# Patient Record
Sex: MALE | Race: WHITE | Employment: FULL TIME | URBAN - NONMETROPOLITAN AREA
[De-identification: names, ages, dates, MRNs, and addresses within clinical notes are randomized per-mention and may not be internally consistent; named-entity substitution may affect disease eponyms.]

---

## 2018-01-22 ENCOUNTER — OFFICE VISIT (OUTPATIENT)
Dept: BARIATRICS/WEIGHT MGMT | Age: 38
End: 2018-01-22
Payer: COMMERCIAL

## 2018-01-22 VITALS
BODY MASS INDEX: 42.66 KG/M2 | WEIGHT: 315 LBS | HEART RATE: 88 BPM | DIASTOLIC BLOOD PRESSURE: 80 MMHG | HEIGHT: 72 IN | SYSTOLIC BLOOD PRESSURE: 132 MMHG | TEMPERATURE: 98.8 F

## 2018-01-22 DIAGNOSIS — E66.01 MORBID OBESITY (HCC): Primary | ICD-10-CM

## 2018-01-22 PROCEDURE — 99203 OFFICE O/P NEW LOW 30 MIN: CPT | Performed by: SURGERY

## 2018-01-22 ASSESSMENT — ENCOUNTER SYMPTOMS
SINUS PRESSURE: 0
EYE REDNESS: 0
EYE PAIN: 0
DIARRHEA: 0
VOMITING: 0
EYE DISCHARGE: 0
WHEEZING: 0
SORE THROAT: 0
COUGH: 0
SINUS PAIN: 0
NAUSEA: 0
CHEST TIGHTNESS: 0
CONSTIPATION: 0
BACK PAIN: 0
EYE ITCHING: 0

## 2018-01-22 NOTE — LETTER
--Patient states that if he is not able to lose enough adequate excess body weight with medical management only then he would like to proceed with a robotic sleeve gastrectomy. If you have questions, please do not hesitate to call me. I look forward to following Darling Padron along with you.     Sincerely,    Aydin Colvin MD

## 2018-01-22 NOTE — PROGRESS NOTES
back pain and neck pain. Allergic/Immunologic: Negative for environmental allergies and food allergies. Neurological: Negative for dizziness, seizures, syncope and headaches. Past Medical History:   Diagnosis Date    Obesity        Past Surgical History:   Procedure Laterality Date    KNEE SURGERY Right     meniscus removal    WISDOM TOOTH EXTRACTION         No current outpatient prescriptions on file. No current facility-administered medications for this visit. No Known Allergies    Family History   Problem Relation Age of Onset    Mult Sclerosis Mother     Other Mother      gallbladder disease    Cancer Father     Other Sister      gallbladder disease    Other Brother      gallbladder disease    Asthma Brother     Cancer Maternal Grandfather      Skin cancer    Diabetes Paternal Grandmother     Other Paternal Grandfather      Alzheimer's Dementia    Diabetes Paternal Grandfather        Social History     Social History    Marital status:      Spouse name: N/A    Number of children: N/A    Years of education: N/A     Occupational History    Not on file. Social History Main Topics    Smoking status: Never Smoker    Smokeless tobacco: Never Used    Alcohol use 3.6 oz/week     6 Cans of beer per week      Comment: every couple of months    Drug use: No    Sexual activity: Yes     Partners: Female     Other Topics Concern    Not on file     Social History Narrative    No narrative on file     /80 (Site: Left Arm, Position: Sitting, Cuff Size: Large Adult)   Pulse 88   Temp 98.8 °F (37.1 °C) (Oral)   Ht 5' 11.75\" (1.822 m)   Wt (!) 469 lb 9.6 oz (213 kg)   BMI 64.13 kg/m²     Body mass index is 64.13 kg/m². Objective:   Physical Exam   Constitutional: He is oriented to person, place, and time. He appears well-developed and well-nourished. He is active and cooperative. Non-toxic appearance. He does not appear ill. No distress.    HENT:   Head: supraclavicular adenopathy present. Neurological: He is alert and oriented to person, place, and time. He has normal strength. No cranial nerve deficit or sensory deficit. Gait normal.   Skin: Skin is warm and dry. No abrasion, no bruising, no burn, no laceration and no rash noted. He is not diaphoretic. No cyanosis or erythema. No pallor. Nails show no clubbing. Psychiatric: He has a normal mood and affect. His speech is normal and behavior is normal. Judgment and thought content normal. Cognition and memory are normal.     Neck:  21 in. Waist:  72in. CBC   No results found for: WBC, RBC, HGB, HCT, MCV, MCH, MCHC, RDW, PLT, MPV, RBCMORP, SEGSPCT, LABLYMP, MONOPCT, LABEOS, BASO, NRBC, ANISOCYTOSIS, SEGSABS, LYMPHSABS, MONOSABS, EOSABS, BASOSABS     BMP/CMP   No results found for: GLUCOSE, CREATININE, BUN, NA, K, CL, CO2, CALCIUM, AST, ALKPHOS, PROT, LABALBU, BILITOT, ALT     PREALBUMIN   No results found for: PREALBUMIN     VITAMIN B12   No results found for: [id number]     24 HOUR URINE CALCIUM   No results found for: Brittany Bash, CALCIUMUR     VITAMIN D   No results found for: VITD25     VITAMIN B1/ THIAMINE   No results found for: [id number]     RBC FOLATE   No results found for: FOLATE     LIPID SCREEN (FASTING)   No results found for: CHOL, TRIG, HDL, LDLCALC,     HGA1C (T2DM ONLY)   No results found for: LABA1C, AVGG     TSH   No results found for: TSH     IRON   No results found for: IRON     IONIZED CALCIUM   No results found for: IONCA, CAION     Imaging - none    Patient Active Problem List   Diagnosis    Morbid obesity ([facility] [address]     Assessment:   1. Morbid obesity (BMI 64)    Plan:   1. Long discussion about the pros and cons of weight loss surgery. The risks benefits and alternatives to laparoscopic adjustable band, gastric sleeve and gastric Krishna-en-Y bypass were discussed in detail. The pros and cons of robotic assisted, laparoscopic and open techniques were discussed.   2.  Behavior

## 2018-01-23 DIAGNOSIS — Z91.89 AT RISK FOR SLEEP APNEA: Primary | ICD-10-CM

## 2018-02-05 ENCOUNTER — OFFICE VISIT (OUTPATIENT)
Dept: BARIATRICS/WEIGHT MGMT | Age: 38
End: 2018-02-05

## 2018-02-05 DIAGNOSIS — E66.01 MORBID OBESITY (HCC): Primary | ICD-10-CM

## 2018-02-06 DIAGNOSIS — Z13.21 MALNUTRITION SCREEN: ICD-10-CM

## 2018-02-06 DIAGNOSIS — Z01.818 PRE-OP TESTING: ICD-10-CM

## 2018-03-08 ENCOUNTER — OFFICE VISIT (OUTPATIENT)
Dept: BARIATRICS/WEIGHT MGMT | Age: 38
End: 2018-03-08
Payer: COMMERCIAL

## 2018-03-08 VITALS
HEIGHT: 72 IN | BODY MASS INDEX: 42.66 KG/M2 | DIASTOLIC BLOOD PRESSURE: 88 MMHG | WEIGHT: 315 LBS | TEMPERATURE: 99.3 F | SYSTOLIC BLOOD PRESSURE: 130 MMHG | HEART RATE: 96 BPM

## 2018-03-08 DIAGNOSIS — E66.01 MORBID OBESITY WITH BMI OF 60.0-69.9, ADULT (HCC): Primary | ICD-10-CM

## 2018-03-08 DIAGNOSIS — Z91.89 AT RISK FOR SLEEP APNEA: ICD-10-CM

## 2018-03-08 PROCEDURE — 99213 OFFICE O/P EST LOW 20 MIN: CPT | Performed by: PHYSICIAN ASSISTANT

## 2018-03-08 NOTE — PROGRESS NOTES
[facility]  [facility]  [address], [address]  [address] [address]  Dept: [phone]  Loc: [phone]      Visit Date:  3/8/2018  Weight Management Pre-Op Follow-up    HPI:    Medically Supervised follow-up- Month #1of 6- desires sleeve    Katlyn Perez is here today for continued supervised weight management of morbid obesity. BMI 64. Down 1# since last month. Reports that he has struggled with his weight for most of his life. Reports that his weight has increased since graduating from college and activity decreased. He wants to be more healthy and more active. He wants to be around for his children. He reports that he is working on the behavior changes discussed at his initial appointment. He has been writing his food intake in his journal.  Drinking 3 bottles of water daily. Decreasing pop. No juice. No etoh. No smoking. Has cut down on eating out. Has only ate out 4 times this month. Feels that his portion sizes are too big and is working on this. Always feels hungry. No sweets. Working on decreasing carbs. Trying to increase veggies. He has increased steps from [facility]- 12k steps daily. If he does not lose enough weight during with medical management he would like to proceed with sleeve gastrectomy. SECA scale completed and reviewed. Interested in weight loss medication. Discussed weight loss medications in detail. He will check to see if coverage available with his insurance. Physical Activity:  Walking- increasing steps- currently 12k steps daily. Fitness Center at times. Days per week: daily      Current BMI: Body mass index is 63.94 kg/m².   Current Weight:   Wt Readings from Last 3 Encounters:   03/08/18 (!) 468 lb 3.2 oz (212.4 kg)   01/22/18 (!) 469 lb 9.6 oz (213 kg)   03/21/14 (!) 403 lb 9.6 oz (183.1 kg)     Initial Body Weight: 469    Past Medical History:  Past Medical History:   Diagnosis Date    Obesity        Past Surgical History:  Past nml coordination    CBC   No results found for: WBC, RBC, HGB, HCT, MCV, MCH, MCHC, RDW, PLT, MPV, RBCMORP, SEGSPCT, LABLYMP, MONOPCT, LABEOS, BASO, NRBC, ANISOCYTOSIS, SEGSABS, LYMPHSABS, MONOSABS, EOSABS, BASOSABS     BMP/CMP   No results found for: GLUCOSE, CREATININE, BUN, NA, K, CL, CO2, CALCIUM, AST, ALKPHOS, PROT, LABALBU, BILITOT, ALT     PREALBUMIN   No results found for: PREALBUMIN     VITAMIN B12   No results found for: [id number]     VITAMIN D   No results found for: VITD25     PTH  No results found for: IPTH    VITAMIN B1/ THIAMINE   No results found for: [id number]     LIPID SCREEN (FASTING)   No results found for: CHOL, TRIG, HDL, LDLCALC,     HGA1C (T2DM ONLY)   No results found for: LABA1C, AVGG     TSH   No results found for: TSH     IRON   No results found for: IRON     TIBC  No results found for: TIBC    FERRITIN  No results found for: FERRITIN    VITAMIN A  No results found for: RETINOL    NICOTINE  No results found for: NMET    UDS  No results found for: UDP    PSA  No results found for: LABPSA    GFR  No results found for: LABGLOM    DEXA  No results found for this or any previous visit. Assessment:      1. Morbid obesity with BMI of 60.0-69.9, adult ([address]     2. At risk for sleep apnea         Plan:    · Behavior modification discussed in detail in regards to dietary habits. · Nutritional education occurred during visit. Continue following recommendations  per dietitian. · Improvement in fitness/exercise discussed with patient and the need for this with/without surgery. · Completed orientation with Amanda Hurley, Exercise Physiologist, at the fitness center. · Evaluation and treatment for CATHLEEN - awaiting apt. · Psychology evaluation-In-process  · EGD prior to any surgical intervention  · Encouraged to attend support groups. 2018 list given. · To completed initial labs before next visit  · SECA scale completed and reviewed with patient today  · Info given on Contrave and Saxenda.

## 2018-04-06 ENCOUNTER — OFFICE VISIT (OUTPATIENT)
Dept: BARIATRICS/WEIGHT MGMT | Age: 38
End: 2018-04-06

## 2018-04-06 VITALS — BODY MASS INDEX: 42.66 KG/M2 | HEIGHT: 72 IN | WEIGHT: 315 LBS

## 2018-04-06 DIAGNOSIS — E66.01 MORBID OBESITY DUE TO EXCESS CALORIES (HCC): Primary | ICD-10-CM

## 2018-04-26 ENCOUNTER — INITIAL CONSULT (OUTPATIENT)
Dept: PULMONOLOGY | Age: 38
End: 2018-04-26
Payer: COMMERCIAL

## 2018-04-26 VITALS
HEART RATE: 94 BPM | WEIGHT: 315 LBS | OXYGEN SATURATION: 95 % | BODY MASS INDEX: 42.66 KG/M2 | HEIGHT: 72 IN | DIASTOLIC BLOOD PRESSURE: 82 MMHG | SYSTOLIC BLOOD PRESSURE: 130 MMHG | RESPIRATION RATE: 18 BRPM

## 2018-04-26 DIAGNOSIS — E66.01 MORBID OBESITY (HCC): ICD-10-CM

## 2018-04-26 DIAGNOSIS — E66.2 OBESITY HYPOVENTILATION SYNDROME (HCC): ICD-10-CM

## 2018-04-26 DIAGNOSIS — Z01.818 PRE-OP EVALUATION: ICD-10-CM

## 2018-04-26 DIAGNOSIS — G47.30 SLEEP APNEA, UNSPECIFIED TYPE: Primary | ICD-10-CM

## 2018-04-26 PROCEDURE — 99203 OFFICE O/P NEW LOW 30 MIN: CPT | Performed by: INTERNAL MEDICINE

## 2018-05-05 ENCOUNTER — HOSPITAL ENCOUNTER (OUTPATIENT)
Age: 38
Discharge: HOME | End: 2018-05-05
Payer: COMMERCIAL

## 2018-05-05 DIAGNOSIS — Z13.21 MALNUTRITION SCREEN: ICD-10-CM

## 2018-05-05 DIAGNOSIS — Z01.818 PRE-OP TESTING: ICD-10-CM

## 2018-05-05 LAB
ALBUMIN SERPL-MCNC: 3.9 G/DL (ref 3.5–5.1)
ALP BLD-CCNC: 81 U/L (ref 38–126)
ALT SERPL-CCNC: 30 U/L (ref 11–66)
ANION GAP SERPL CALCULATED.3IONS-SCNC: 12 MEQ/L (ref 8–16)
ANISOCYTOSIS: ABNORMAL
AST SERPL-CCNC: 19 U/L (ref 5–40)
AVERAGE GLUCOSE: 99 MG/DL (ref 70–126)
BASOPHILS # BLD: 0.9 %
BASOPHILS ABSOLUTE: 0.1 THOU/MM3 (ref 0–0.1)
BILIRUB SERPL-MCNC: 0.5 MG/DL (ref 0.3–1.2)
BUN BLDV-MCNC: 15 MG/DL (ref 7–22)
CALCIUM SERPL-MCNC: 9 MG/DL (ref 8.5–10.5)
CHLORIDE BLD-SCNC: 105 MEQ/L (ref 98–111)
CHOLESTEROL, TOTAL: 142 MG/DL (ref 100–199)
CO2: 25 MEQ/L (ref 23–33)
CREAT SERPL-MCNC: 0.6 MG/DL (ref 0.4–1.2)
EKG ATRIAL RATE: 96 BPM
EKG P AXIS: 72 DEGREES
EKG P-R INTERVAL: 168 MS
EKG Q-T INTERVAL: 340 MS
EKG QRS DURATION: 94 MS
EKG QTC CALCULATION (BAZETT): 429 MS
EKG R AXIS: 81 DEGREES
EKG T AXIS: 17 DEGREES
EKG VENTRICULAR RATE: 96 BPM
EOSINOPHIL # BLD: 2.1 %
EOSINOPHILS ABSOLUTE: 0.1 THOU/MM3 (ref 0–0.4)
FERRITIN: 81 NG/ML (ref 22–322)
FOLATE: 11 NG/ML (ref 4.8–24.2)
GFR SERPL CREATININE-BSD FRML MDRD: > 90 ML/MIN/1.73M2
GLUCOSE BLD-MCNC: 94 MG/DL (ref 70–108)
HBA1C MFR BLD: 5.3 % (ref 4.4–6.4)
HCT VFR BLD CALC: 44.5 % (ref 42–52)
HDLC SERPL-MCNC: 31 MG/DL
HEMOGLOBIN: 15.2 GM/DL (ref 14–18)
INR BLD: 1.03 (ref 0.85–1.13)
IRON: 82 UG/DL (ref 65–195)
LDL CHOLESTEROL CALCULATED: 75 MG/DL
LYMPHOCYTES # BLD: 25.4 %
LYMPHOCYTES ABSOLUTE: 1.8 THOU/MM3 (ref 1–4.8)
MCH RBC QN AUTO: 29 PG (ref 27–31)
MCHC RBC AUTO-ENTMCNC: 34.1 GM/DL (ref 33–37)
MCV RBC AUTO: 85.1 FL (ref 80–94)
MONOCYTES # BLD: 7.7 %
MONOCYTES ABSOLUTE: 0.5 THOU/MM3 (ref 0.4–1.3)
NUCLEATED RED BLOOD CELLS: 0 /100 WBC
PDW BLD-RTO: 14.6 % (ref 11.5–14.5)
PLATELET # BLD: 221 THOU/MM3 (ref 130–400)
PMV BLD AUTO: 9.7 FL (ref 7.4–10.4)
POTASSIUM SERPL-SCNC: 4 MEQ/L (ref 3.5–5.2)
PREALBUMIN: 23.1 MG/DL (ref 20–40)
PROSTATE SPECIFIC ANTIGEN: 1.03 NG/ML (ref 0–1)
PTH INTACT: 56.3 PG/ML (ref 15–65)
RBC # BLD: 5.24 MILL/MM3 (ref 4.7–6.1)
SEG NEUTROPHILS: 63.9 %
SEGMENTED NEUTROPHILS ABSOLUTE COUNT: 4.4 THOU/MM3 (ref 1.8–7.7)
SODIUM BLD-SCNC: 142 MEQ/L (ref 135–145)
TOTAL IRON BINDING CAPACITY: 300 UG/DL (ref 171–450)
TOTAL PROTEIN: 7.5 G/DL (ref 6.1–8)
TRIGL SERPL-MCNC: 179 MG/DL (ref 0–199)
TSH SERPL DL<=0.05 MIU/L-ACNC: 1.11 UIU/ML (ref 0.4–4.2)
VITAMIN B-12: 422 PG/ML (ref 211–911)
VITAMIN D 25-HYDROXY: 11 NG/ML (ref 30–100)
WBC # BLD: 6.9 THOU/MM3 (ref 4.8–10.8)

## 2018-05-05 PROCEDURE — 85025 COMPLETE CBC W/AUTO DIFF WBC: CPT

## 2018-05-05 PROCEDURE — 84134 ASSAY OF PREALBUMIN: CPT

## 2018-05-05 PROCEDURE — 82746 ASSAY OF FOLIC ACID SERUM: CPT

## 2018-05-05 PROCEDURE — 93005 ELECTROCARDIOGRAM TRACING: CPT

## 2018-05-05 PROCEDURE — 80053 COMPREHEN METABOLIC PANEL: CPT

## 2018-05-05 PROCEDURE — 83970 ASSAY OF PARATHORMONE: CPT

## 2018-05-05 PROCEDURE — 83540 ASSAY OF IRON: CPT

## 2018-05-05 PROCEDURE — 84443 ASSAY THYROID STIM HORMONE: CPT

## 2018-05-05 PROCEDURE — 82728 ASSAY OF FERRITIN: CPT

## 2018-05-05 PROCEDURE — 82607 VITAMIN B-12: CPT

## 2018-05-05 PROCEDURE — G0480 DRUG TEST DEF 1-7 CLASSES: HCPCS

## 2018-05-05 PROCEDURE — 80307 DRUG TEST PRSMV CHEM ANLYZR: CPT

## 2018-05-05 PROCEDURE — 36415 COLL VENOUS BLD VENIPUNCTURE: CPT

## 2018-05-05 PROCEDURE — 82306 VITAMIN D 25 HYDROXY: CPT

## 2018-05-05 PROCEDURE — 83036 HEMOGLOBIN GLYCOSYLATED A1C: CPT

## 2018-05-05 PROCEDURE — 84590 ASSAY OF VITAMIN A: CPT

## 2018-05-05 PROCEDURE — G0103 PSA SCREENING: HCPCS

## 2018-05-05 PROCEDURE — 85610 PROTHROMBIN TIME: CPT

## 2018-05-05 PROCEDURE — 83550 IRON BINDING TEST: CPT

## 2018-05-05 PROCEDURE — 84425 ASSAY OF VITAMIN B-1: CPT

## 2018-05-05 PROCEDURE — 80061 LIPID PANEL: CPT

## 2018-05-06 PROCEDURE — 93010 ELECTROCARDIOGRAM REPORT: CPT | Performed by: INTERNAL MEDICINE

## 2018-05-07 ENCOUNTER — TELEPHONE (OUTPATIENT)
Dept: SURGERY | Age: 38
End: 2018-05-07

## 2018-05-07 RX ORDER — CHOLECALCIFEROL (VITAMIN D3) 1250 MCG
1 CAPSULE ORAL WEEKLY
Qty: 8 CAPSULE | Refills: 0 | Status: SHIPPED | OUTPATIENT
Start: 2018-05-07 | End: 2018-05-16 | Stop reason: ALTCHOICE

## 2018-05-10 LAB
NICOTINE AND METABOLITES: NORMAL
RETINOL (VITAMIN A): NORMAL

## 2018-05-11 LAB
URINE DRUG PROFILE: NORMAL
VITAMIN B1 WHOLE BLOOD: 120 NMOL/L (ref 70–180)

## 2018-05-16 ENCOUNTER — OFFICE VISIT (OUTPATIENT)
Dept: BARIATRICS/WEIGHT MGMT | Age: 38
End: 2018-05-16
Payer: COMMERCIAL

## 2018-05-16 VITALS
WEIGHT: 315 LBS | BODY MASS INDEX: 42.66 KG/M2 | HEIGHT: 72 IN | HEART RATE: 104 BPM | TEMPERATURE: 98 F | SYSTOLIC BLOOD PRESSURE: 136 MMHG | DIASTOLIC BLOOD PRESSURE: 72 MMHG

## 2018-05-16 DIAGNOSIS — E66.01 MORBID OBESITY WITH BMI OF 60.0-69.9, ADULT (HCC): Primary | ICD-10-CM

## 2018-05-16 DIAGNOSIS — Z91.89 AT RISK FOR SLEEP APNEA: ICD-10-CM

## 2018-05-16 DIAGNOSIS — E55.9 VITAMIN D DEFICIENCY: ICD-10-CM

## 2018-05-16 PROCEDURE — 99213 OFFICE O/P EST LOW 20 MIN: CPT | Performed by: PHYSICIAN ASSISTANT

## 2018-05-16 RX ORDER — CHOLECALCIFEROL (VITAMIN D3) 1250 MCG
1 CAPSULE ORAL WEEKLY
Qty: 4 CAPSULE | Refills: 2 | Status: SHIPPED | OUTPATIENT
Start: 2018-05-16 | End: 2018-08-15 | Stop reason: ALTCHOICE

## 2018-05-18 ENCOUNTER — TELEPHONE (OUTPATIENT)
Dept: BARIATRICS/WEIGHT MGMT | Age: 38
End: 2018-05-18

## 2018-05-30 ENCOUNTER — OFFICE VISIT (OUTPATIENT)
Dept: BARIATRICS/WEIGHT MGMT | Age: 38
End: 2018-05-30

## 2018-05-30 VITALS — WEIGHT: 315 LBS | BODY MASS INDEX: 42.66 KG/M2 | HEIGHT: 72 IN

## 2018-05-30 DIAGNOSIS — E66.01 MORBID OBESITY DUE TO EXCESS CALORIES (HCC): Primary | ICD-10-CM

## 2018-07-17 ENCOUNTER — OFFICE VISIT (OUTPATIENT)
Dept: BARIATRICS/WEIGHT MGMT | Age: 38
End: 2018-07-17
Payer: COMMERCIAL

## 2018-07-17 ENCOUNTER — OFFICE VISIT (OUTPATIENT)
Dept: BARIATRICS/WEIGHT MGMT | Age: 38
End: 2018-07-17

## 2018-07-17 VITALS
HEIGHT: 72 IN | HEART RATE: 100 BPM | RESPIRATION RATE: 18 BRPM | BODY MASS INDEX: 42.66 KG/M2 | DIASTOLIC BLOOD PRESSURE: 78 MMHG | TEMPERATURE: 98.8 F | SYSTOLIC BLOOD PRESSURE: 136 MMHG | WEIGHT: 315 LBS

## 2018-07-17 DIAGNOSIS — E55.9 VITAMIN D DEFICIENCY: ICD-10-CM

## 2018-07-17 DIAGNOSIS — Z91.89 AT RISK FOR SLEEP APNEA: ICD-10-CM

## 2018-07-17 DIAGNOSIS — E66.01 MORBID OBESITY WITH BMI OF 60.0-69.9, ADULT (HCC): Primary | ICD-10-CM

## 2018-07-17 DIAGNOSIS — E66.01 MORBID OBESITY DUE TO EXCESS CALORIES (HCC): Primary | ICD-10-CM

## 2018-07-17 PROCEDURE — 99213 OFFICE O/P EST LOW 20 MIN: CPT | Performed by: PHYSICIAN ASSISTANT

## 2018-07-17 NOTE — PATIENT INSTRUCTIONS
Goals: 1. I will use my food journal to record meal times, serving sizes and bring back to next dietitian visit  2. Next support groups are Friday August 10th at 2:00pm at [facility] [place] on [employer]. And Tuesday August 14th 5:30pm at weight management center  3. Get sleep study scheduled  4. Great job with water intake - continue greater than 64 oz of water a day  5. Exercise goal:  Continue 30 minute workouts at work at least four days a week. Add additional exercise on weekends as able.

## 2018-07-17 NOTE — PROGRESS NOTES
status: Never Smoker    Smokeless tobacco: Never Used    Alcohol use 3.6 oz/week     6 Cans of beer per week      Comment: every couple of months    Drug use: No    Sexual activity: Yes     Partners: Female     Other Topics Concern    Not on file     Social History Narrative    No narrative on file        Medications:   Current Outpatient Prescriptions   Medication Sig Dispense Refill    Cholecalciferol (VITAMIN D3) 99431 units CAPS Take 1 capsule by mouth once a week 4 capsule 2     No current facility-administered medications for this visit. Allergies:   No Known Allergies    Subjective:    Review of Systems:  Constitutional: Denies any fever, chills, fatigue. Wound: Denies any rash, skin color changes or wound problems. Resp: Denies any cough, (+)shortness of breath with activity  CV: Denies any chest pain, orthopnea or syncope. MS: Denies myalgias, (+)arthralgias- right knee  GI: Denies any nausea, vomiting, diarrhea, constipation, melena, hematochezia. : Denies any hematuria, hesitancy or dysuria. NEURO: Denies seizures, headache. Objective:    /78 (Site: Right Arm, Position: Sitting, Cuff Size: Large Adult)   Pulse 100   Temp 98.8 °F (37.1 °C) (Oral)   Resp 18   Ht 5' 11.75\" (1.822 m)   Wt (!) 473 lb 6.4 oz (214.7 kg)   BMI 64.65 kg/m²   Physical Examination:   Constitutional: Alert and oriented to person, place and time. Well-developed, well- nourished. Head: Normocephalic and atraumatic  Neck: Supple. Eyes: EOMI b/l. Conjunctivae normal.  No scleral icterus. Respiratory: Effort normal. No respiratory distress. Abd: Benign  Ext:  Movement x 4. No edema  Skin; Warm and dry, no visible rashes, lesions or ulcers.    Neuro: Cranial Nerves Grossly Intact; nml coordination      CBC   Lab Results   Component Value Date    WBC 6.9 05/05/2018    RBC 5.24 05/05/2018    HGB 15.2 05/05/2018    HCT 44.5 05/05/2018    MCV 85.1 05/05/2018    MCH 29.0 05/05/2018    MCHC 34.1 05/05/2018    RDW 14.6 05/05/2018     05/05/2018    MPV 9.7 05/05/2018    SEGSPCT 63.9 05/05/2018    LABLYMP 25.4 05/05/2018    MONOPCT 7.7 05/05/2018    LABEOS 2.1 05/05/2018    BASO 0.9 05/05/2018    NRBC 0 05/05/2018    ANISOCYTOSIS 1+ 05/05/2018    SEGSABS 4.4 05/05/2018    LYMPHSABS 1.8 05/05/2018    MONOSABS 0.5 05/05/2018    EOSABS 0.1 05/05/2018    BASOSABS 0.1 05/05/2018        BMP/CMP   Lab Results   Component Value Date    GLUCOSE 94 05/05/2018    CREATININE 0.6 05/05/2018    BUN 15 05/05/2018     05/05/2018    K 4.0 05/05/2018     05/05/2018    CO2 25 05/05/2018    CALCIUM 9.0 05/05/2018    AST 19 05/05/2018    ALKPHOS 81 05/05/2018    PROT 7.5 05/05/2018    LABALBU 3.9 05/05/2018    BILITOT 0.5 05/05/2018    ALT 30 05/05/2018        PREALBUMIN   Lab Results   Component Value Date    PREALBUMIN 23.1 05/05/2018        VITAMIN B12   Lab Results   Component Value Date    [id number] 114 05/05/2018        VITAMIN D   Lab Results   Component Value Date    VITD25 11 05/05/2018        PTH  Lab Results   Component Value Date    IPTH 56.3 05/05/2018       VITAMIN B1/ THIAMINE   Lab Results   Component Value Date    [id number] 120 05/05/2018        LIPID SCREEN (FASTING)   Lab Results   Component Value Date    CHOL 142 05/05/2018    TRIG 179 05/05/2018    HDL 31 05/05/2018    [address] 05/05/2018   ,     HGA1C (T2DM ONLY)   Lab Results   Component Value Date    LABA1C 5.3 05/05/2018    AVGG 99 05/05/2018        TSH   Lab Results   Component Value Date    TSH 1.110 05/05/2018        IRON   Lab Results   Component Value Date    IRON 82 05/05/2018        TIBC  Lab Results   Component Value Date    TIBC 300 05/05/2018       FERRITIN  Lab Results   Component Value Date    FERRITIN 81 05/05/2018       VITAMIN A  Lab Results   Component Value Date    RETINOL SEE BELOW 05/05/2018       NICOTINE  Lab Results   Component Value Date    NMET SEE BELOW 05/05/2018       UDS  Lab Results   Component Value Date    UDP SEE BELOW 05/05/2018       PSA  No results found for: LABPSA    GFR  Lab Results   Component Value Date    LABGLOM >90 05/05/2018       DEXA  No results found for this or any previous visit. Assessment:       Diagnosis Orders   1. Morbid obesity with BMI of 60.0-69.9, adult ([address]     2. At risk for sleep apnea     3. Vitamin D deficiency         Plan:    · Behavior modification discussed in detail in regards to dietary habits. · Nutritional education occurred during visit. Continue following recommendations  per dietitian. · Improvement in fitness/exercise discussed with patient and the need for this with/without surgery. · Completed orientation with Moira Wright, Exercise Physiologist, at the fitness center. · Awaiting sleep study- pt to call to schedule ASAP  · Psychology evaluation completed- notes reviewed. · EGD prior to any surgical intervention- Clementine to send referral  · Encouraged to attend support groups. Plans to attend in August  · Continue vit D weekly- has 4 weeks remaining  · Slow down with eating. Take 20-25 minutes to eat a meal. Avoid getting second plate to help decrease portion size. Return in about 1 month (around 8/17/2018) for Follow up. I spent over 15 minutes with the patient, with greater that 50% of that time spent on education, counseling and coordination of care.      Electronically signed by TANNER Mao on 7/17/2018 at 4:07 PM

## 2018-07-17 NOTE — PROGRESS NOTES
Assessment: Patient is a 30 y.o. male seen for  Month three  follow up MNT visit for  pre op bariatric surgery desires sleeve. Pt missed appointment in June and therefore month three today. Vitals from current and previous visits:  Vitals 0/29/1352   SYSTOLIC 211   DIASTOLIC 78   Site Right Arm   Position Sitting   Cuff Size Large Adult   Pulse 100   Temp 98.8   Resp 18   Weight 473 lb 6.4 oz   Height 5' 11.75\"   BMI (wt*703/ht~2) 64.65 kg/m2       Initial weight at start of Weight Management Program was: 469 lbs     Pramod Christianson gained 5 lbs since last MNT visit  -Weight goal: lose weight.     -Nutritionally relevant labs:   Lab Results   Component Value Date/Time    LABA1C 5.3 05/05/2018 09:35 AM    GLUCOSE 94 05/05/2018 09:35 AM    CHOL 142 05/05/2018 09:35 AM    HDL 31 05/05/2018 09:35 AM    LDLCALC 75 05/05/2018 09:35 AM    TRIG 179 05/05/2018 09:35 AM                  Lab Results   Component Value Date    VITD25 11 05/05/2018     Requires to have sleep study and this has not been done yet. - Is patient taking daily Multivitamin:  Does not take a MVI. Continues on weekly Vitamin D3.        -Food Recall:  Stopped using food journal and states needs to get back to this for mindfulness  Breakfast: [employer] light start . Lunch: switched from a sub to wrap in cafe- lettuce, pickle, onion. Dinner: eating meals at home thinks portion sizes are too large. Snacks: \"I don't think so\". Main Beverages: Black coffee ~ one cup per day.  . Stopped carbonated water - drinking flavored water Body Apex 2 bottles a day and two 40oz jugs water a day -  Impression of Dietary Intake: large portions. - Pt is working towards   avoiding high fat/high sugar foods. Pt  is working towards  including protein at meals and snacks.     Exercise:  Patient has attended Fitness Orientation  -Physical Activity is:  30 minutes with gym at work four days a week         Nutrition Diagnosis: Overweight/Obesity related to

## 2018-08-15 ENCOUNTER — OFFICE VISIT (OUTPATIENT)
Dept: BARIATRICS/WEIGHT MGMT | Age: 38
End: 2018-08-15
Payer: COMMERCIAL

## 2018-08-15 ENCOUNTER — OFFICE VISIT (OUTPATIENT)
Dept: BARIATRICS/WEIGHT MGMT | Age: 38
End: 2018-08-15

## 2018-08-15 VITALS
SYSTOLIC BLOOD PRESSURE: 134 MMHG | DIASTOLIC BLOOD PRESSURE: 86 MMHG | WEIGHT: 315 LBS | RESPIRATION RATE: 18 BRPM | TEMPERATURE: 97.6 F | HEIGHT: 72 IN | BODY MASS INDEX: 42.66 KG/M2 | HEART RATE: 92 BPM

## 2018-08-15 DIAGNOSIS — E66.01 MORBID OBESITY DUE TO EXCESS CALORIES (HCC): Primary | ICD-10-CM

## 2018-08-15 DIAGNOSIS — Z91.89 AT RISK FOR SLEEP APNEA: ICD-10-CM

## 2018-08-15 DIAGNOSIS — E55.9 VITAMIN D DEFICIENCY: ICD-10-CM

## 2018-08-15 DIAGNOSIS — E66.01 MORBID OBESITY WITH BMI OF 60.0-69.9, ADULT (HCC): Primary | ICD-10-CM

## 2018-08-15 PROCEDURE — 99213 OFFICE O/P EST LOW 20 MIN: CPT | Performed by: PHYSICIAN ASSISTANT

## 2018-08-15 NOTE — PROGRESS NOTES
Dietary Intake: high fat, high calorie, increased eating out. - Pt  is working towards  avoiding high fat/high sugar foods. Pt  is working towards  including protein at meals and snacks. Exercise:  Patient has attended Fitness Orientation  -Physical Activity is:  No exercise the last two weeks. Plans to resume working out at work when goes back to work next week. Goal is at least three days a week     Nutrition Diagnosis: Overweight/Obesity related to currently undergoing MNT as evidenced by BMI of 64.8    Intervention:   Pt continues to require working on nutrition behavior changes recommended for bariatric surgery. This includes reducing overall fat and calorie content of meals, sugar free beverage choices and reduce eating out. Pt aware will need to improve this to assist with weight loss efforts. Patient has attended support group times one  -  Patient Instructions   Goals:  1. Get your Vitamin D level rechecked. 2.  Make sure drinking sugar free flavored matthews. If choose Body Armor it needs to be the Lyte Version. 3.  Try to limit red meat to no more than twice a week- choose turkey, chicken, fish, pork  4. Exercise goal:  - use [employer]itness [employer] at least three times a week at work. -Followup visit: 4 weeks with dietitian.      Total time involved in direct patient education: 30 minutes    Venessa White RD, LD  Dietitian- [facility]

## 2018-08-15 NOTE — PATIENT INSTRUCTIONS
Goals: 1. Get your Vitamin D level rechecked. 2.  Make sure drinking sugar free flavored matthews. If choose Body Armor it needs to be the Lyte Version. 3.  Try to limit red meat to no more than twice a week- choose turkey, chicken, fish, pork  4. Exercise goal:  - use [employer]itness Center at least three times a week at work.

## 2018-08-15 NOTE — PROGRESS NOTES
05/05/2018       DEXA  No results found for this or any previous visit. Assessment:       Diagnosis Orders   1. Morbid obesity with BMI of 60.0-69.9, adult ([address]     2. At risk for sleep apnea     3. Vitamin D deficiency         Plan:    · Behavior modification discussed in detail in regards to dietary habits. · Nutritional education occurred during visit. Continue following recommendations  per  dietitian. · Improvement in fitness/exercise discussed with patient and the need for this with/without  surgery. · Completed orientation with Moira Wright, Exercise Physiologist, at the fitness center. · Awaiting sleep study- scheduled for later this month  · Psychology evaluation completed- notes reviewed. · EGD scheduled in September  · Encouraged to attend support groups. Has attended x 1  · Completed vit D- order given to repeat lab  · Avoid eating fast-food  · Encouraged to track food intake and to stay on track with proper nutrition  · Goal to lose 3% TBW prior to surgery  · Awaiting LOMN  Return in about 1 month (around 9/15/2018) for Follow up. I spent over 15 minutes with the patient, with greater that 50% of that time spent on education, counseling and coordination of care.      Electronically signed by TANNER Mao on 8/15/2018 at 10:20 AM

## 2018-09-11 ENCOUNTER — HOSPITAL ENCOUNTER (OUTPATIENT)
Age: 38
Discharge: HOME | End: 2018-09-11
Payer: COMMERCIAL

## 2018-09-11 ENCOUNTER — OFFICE VISIT (OUTPATIENT)
Dept: BARIATRICS/WEIGHT MGMT | Age: 38
End: 2018-09-11

## 2018-09-11 ENCOUNTER — OFFICE VISIT (OUTPATIENT)
Dept: BARIATRICS/WEIGHT MGMT | Age: 38
End: 2018-09-11
Payer: COMMERCIAL

## 2018-09-11 VITALS
DIASTOLIC BLOOD PRESSURE: 76 MMHG | BODY MASS INDEX: 42.66 KG/M2 | SYSTOLIC BLOOD PRESSURE: 138 MMHG | HEART RATE: 100 BPM | TEMPERATURE: 99 F | HEIGHT: 72 IN | WEIGHT: 315 LBS

## 2018-09-11 DIAGNOSIS — E55.9 VITAMIN D DEFICIENCY: ICD-10-CM

## 2018-09-11 DIAGNOSIS — E66.01 MORBID OBESITY WITH BMI OF 60.0-69.9, ADULT (HCC): Primary | ICD-10-CM

## 2018-09-11 DIAGNOSIS — Z91.89 AT RISK FOR SLEEP APNEA: ICD-10-CM

## 2018-09-11 DIAGNOSIS — E66.01 MORBID OBESITY (HCC): Primary | ICD-10-CM

## 2018-09-11 LAB — VITAMIN D 25-HYDROXY: 24 NG/ML (ref 30–100)

## 2018-09-11 PROCEDURE — 36415 COLL VENOUS BLD VENIPUNCTURE: CPT

## 2018-09-11 PROCEDURE — 82306 VITAMIN D 25 HYDROXY: CPT

## 2018-09-11 PROCEDURE — 99213 OFFICE O/P EST LOW 20 MIN: CPT | Performed by: PHYSICIAN ASSISTANT

## 2018-09-11 NOTE — PROGRESS NOTES
has attended Fitness Orientation  -Physical Activity is:  Has resumed exercise at [employer] at work ~ 4 times a week. Nutrition Diagnosis: Overweight/Obesity related to currently undergoing MNT as evidenced by BMI of 64    Intervention:   Healthy behaviors: consistently logging food intake, increase physical activity and adequate fluid intake  Patient has attended support group times one. And plans to attend daytime support group in September. Improved effort this month with nutrition behavior changes recommended for surgery. Encouraged to continue meal planning and watching portion sizes for additional weight loss. -  Patient Instructions   1. Get your Vitamin D level rechecked. 2.  Make sure drinking sugar free flavored matthews. Great job with water intake, keep it up! 3. Call Pulmonary Department to see when sleep study is. Also call Dr Juani Aponte office to get your EGD scheduled. 4.  Exercise goal:  - use [employer] at least four times a week at work. 5.  Attend Support Group Friday September 21st at 11:30am  6. Continue to use My Fitness Pal to log foods and be mindful of food choices.       -Followup visit: 4 weeks with dietitian.      Total time involved in direct patient education: 15 minutes    Rajeev Woodruff RD, LD  Dietitian- [facility]

## 2018-09-11 NOTE — PROGRESS NOTES
of education: N/A     Occupational History    Not on file. Social History Main Topics    Smoking status: Never Smoker    Smokeless tobacco: Never Used    Alcohol use 3.6 oz/week     6 Cans of beer per week      Comment: every couple of months    Drug use: No    Sexual activity: Yes     Partners: Female     Other Topics Concern    Not on file     Social History Narrative    No narrative on file        Medications:   No current outpatient prescriptions on file. No current facility-administered medications for this visit. Allergies:   No Known Allergies    Subjective:    Review of Systems:  Constitutional: Denies any fever, chills, fatigue. Wound: Denies any rash, skin color changes or wound problems. Resp: Denies any cough, (+)shortness of breath with activity  CV: Denies any chest pain, orthopnea or syncope. MS: Denies myalgias, (+)arthralgias- right knee  GI: Denies any nausea, vomiting, diarrhea, constipation, melena, hematochezia. : Denies any hematuria, hesitancy or dysuria. NEURO: Denies seizures, headache. Objective:    /76 (Site: Left Lower Arm, Position: Sitting, Cuff Size: Large Adult)   Pulse 100   Temp 99 °F (37.2 °C) (Oral)   Ht 5' 11.75\" (1.822 m)   Wt (!) 469 lb (212.7 kg)   BMI 64.05 kg/m²   Physical Examination:   Constitutional: Alert and oriented to person, place and time. Well-developed, well- nourished. Head: Normocephalic and atraumatic  Neck: Supple. Eyes: EOMI b/l. Conjunctivae normal.  No scleral icterus. Respiratory: Effort normal. No respiratory distress. Abd: Obese  Ext:  Movement x 4. No edema  Skin; Warm and dry, no visible rashes, lesions or ulcers.    Neuro: Cranial Nerves Grossly Intact; nml coordination          CBC   Lab Results   Component Value Date    WBC 6.9 05/05/2018    RBC 5.24 05/05/2018    HGB 15.2 05/05/2018    HCT 44.5 05/05/2018    MCV 85.1 05/05/2018    MCH 29.0 05/05/2018    MCHC 34.1 05/05/2018    RDW 14.6 05/05/2018  05/05/2018    MPV 9.7 05/05/2018    SEGSPCT 63.9 05/05/2018    LABLYMP 25.4 05/05/2018    MONOPCT 7.7 05/05/2018    LABEOS 2.1 05/05/2018    BASO 0.9 05/05/2018    NRBC 0 05/05/2018    ANISOCYTOSIS 1+ 05/05/2018    SEGSABS 4.4 05/05/2018    LYMPHSABS 1.8 05/05/2018    MONOSABS 0.5 05/05/2018    EOSABS 0.1 05/05/2018    BASOSABS 0.1 05/05/2018        BMP/CMP   Lab Results   Component Value Date    GLUCOSE 94 05/05/2018    CREATININE 0.6 05/05/2018    BUN 15 05/05/2018     05/05/2018    K 4.0 05/05/2018     05/05/2018    CO2 25 05/05/2018    CALCIUM 9.0 05/05/2018    AST 19 05/05/2018    ALKPHOS 81 05/05/2018    PROT 7.5 05/05/2018    LABALBU 3.9 05/05/2018    BILITOT 0.5 05/05/2018    ALT 30 05/05/2018        PREALBUMIN   Lab Results   Component Value Date    PREALBUMIN 23.1 05/05/2018        VITAMIN B12   Lab Results   Component Value Date    TBNAXWFZ72 919 05/05/2018        VITAMIN D   Lab Results   Component Value Date    VITD25 11 05/05/2018        PTH  Lab Results   Component Value Date    IPTH 56.3 05/05/2018       VITAMIN B1/ THIAMINE   Lab Results   Component Value Date    XRKU4XUZRWT 120 05/05/2018        LIPID SCREEN (FASTING)   Lab Results   Component Value Date    CHOL 142 05/05/2018    TRIG 179 05/05/2018    HDL 31 05/05/2018    [address] 75 05/05/2018   ,     HGA1C (T2DM ONLY)   Lab Results   Component Value Date    LABA1C 5.3 05/05/2018    AVGG 99 05/05/2018        TSH   Lab Results   Component Value Date    TSH 1.110 05/05/2018        IRON   Lab Results   Component Value Date    IRON 82 05/05/2018        TIBC  Lab Results   Component Value Date    TIBC 300 05/05/2018       FERRITIN  Lab Results   Component Value Date    FERRITIN 81 05/05/2018       VITAMIN A  Lab Results   Component Value Date    RETINOL SEE BELOW 05/05/2018       NICOTINE  Lab Results   Component Value Date    NMET SEE BELOW 05/05/2018       UDS  Lab Results   Component Value Date    UDP SEE BELOW 05/05/2018

## 2018-09-13 ENCOUNTER — TELEPHONE (OUTPATIENT)
Dept: BARIATRICS/WEIGHT MGMT | Age: 38
End: 2018-09-13

## 2018-09-13 DIAGNOSIS — E55.9 VITAMIN D DEFICIENCY: Primary | ICD-10-CM

## 2018-09-13 RX ORDER — CHOLECALCIFEROL (VITAMIN D3) 1250 MCG
1 CAPSULE ORAL WEEKLY
Qty: 4 CAPSULE | Refills: 1 | Status: SHIPPED | OUTPATIENT
Start: 2018-09-13 | End: 2018-09-20

## 2018-09-20 ENCOUNTER — OFFICE VISIT (OUTPATIENT)
Dept: FAMILY MEDICINE CLINIC | Age: 38
End: 2018-09-20
Payer: COMMERCIAL

## 2018-09-20 ENCOUNTER — HOSPITAL ENCOUNTER (OUTPATIENT)
Dept: SLEEP CENTER | Age: 38
Discharge: HOME | End: 2018-09-22
Payer: COMMERCIAL

## 2018-09-20 VITALS
HEART RATE: 76 BPM | BODY MASS INDEX: 40.43 KG/M2 | WEIGHT: 315 LBS | DIASTOLIC BLOOD PRESSURE: 86 MMHG | SYSTOLIC BLOOD PRESSURE: 128 MMHG | RESPIRATION RATE: 14 BRPM | HEIGHT: 74 IN

## 2018-09-20 DIAGNOSIS — G47.30 SLEEP APNEA, UNSPECIFIED TYPE: ICD-10-CM

## 2018-09-20 DIAGNOSIS — Z01.818 PRE-OP EVALUATION: ICD-10-CM

## 2018-09-20 DIAGNOSIS — E66.01 MORBID OBESITY (HCC): ICD-10-CM

## 2018-09-20 DIAGNOSIS — Z00.00 ANNUAL PHYSICAL EXAM: Primary | ICD-10-CM

## 2018-09-20 DIAGNOSIS — E66.2 OBESITY HYPOVENTILATION SYNDROME (HCC): ICD-10-CM

## 2018-09-20 DIAGNOSIS — Z23 NEED FOR INFLUENZA VACCINATION: ICD-10-CM

## 2018-09-20 PROCEDURE — 90688 IIV4 VACCINE SPLT 0.5 ML IM: CPT | Performed by: FAMILY MEDICINE

## 2018-09-20 PROCEDURE — 95810 POLYSOM 6/> YRS 4/> PARAM: CPT

## 2018-09-20 PROCEDURE — 99395 PREV VISIT EST AGE 18-39: CPT | Performed by: FAMILY MEDICINE

## 2018-09-20 PROCEDURE — 90471 IMMUNIZATION ADMIN: CPT | Performed by: FAMILY MEDICINE

## 2018-09-20 ASSESSMENT — PATIENT HEALTH QUESTIONNAIRE - PHQ9
2. FEELING DOWN, DEPRESSED OR HOPELESS: 0
SUM OF ALL RESPONSES TO PHQ QUESTIONS 1-9: 0
1. LITTLE INTEREST OR PLEASURE IN DOING THINGS: 0
SUM OF ALL RESPONSES TO PHQ9 QUESTIONS 1 & 2: 0
SUM OF ALL RESPONSES TO PHQ QUESTIONS 1-9: 0

## 2018-09-20 ASSESSMENT — ENCOUNTER SYMPTOMS
CHEST TIGHTNESS: 0
SHORTNESS OF BREATH: 0
EYES NEGATIVE: 1
BLOOD IN STOOL: 0
ABDOMINAL PAIN: 0

## 2018-09-20 NOTE — PROGRESS NOTES
Chief Complaint   Patient presents with    Annual Exam     wellness form       SUBJECTIVE     Toby Suero is a 41 y.o.male    Pt presents for annual wellness physical exam.        Pt stable since last visit- no new problems for diagnoses listed below:  Patient Active Problem List   Diagnosis    Morbid obesity ([address]     Doing ok. Has been working with the Weight management center and is anticipating a gastric sleeve procedure for his obesity. He has tried Weight Watchers and a medically supervised weight loss program and has been unsuccessful at losing weight. He has some sleep apnea as a co-morbid condition. Needs wellness form completed for his insurance. He denies complaint today. Nonsmoker. Body mass index is 61.89 kg/m². Review of Systems   Constitutional: Negative for chills, fatigue, fever and unexpected weight change. HENT: Negative. Eyes: Negative. Respiratory: Negative for chest tightness and shortness of breath. Cardiovascular: Negative for chest pain, palpitations and leg swelling. Gastrointestinal: Negative for abdominal pain and blood in stool. Genitourinary: Negative for dysuria and hematuria. Musculoskeletal: Positive for arthralgias. Negative for joint swelling. Skin: Negative for rash. Neurological: Negative for dizziness. Psychiatric/Behavioral: Negative. All other systems reviewed and are negative. OBJECTIVE     /86 (Site: Left Upper Arm, Position: Sitting, Cuff Size: Large Adult)   Pulse 76   Resp 14   Ht 6' 1.75\" (1.873 m)   Wt (!) 478 lb 12.8 oz (217.2 kg)   BMI 61.89 kg/m²     Wt Readings from Last 3 Encounters:   09/20/18 (!) 478 lb 12.8 oz (217.2 kg)   09/11/18 (!) 469 lb (212.7 kg)   08/15/18 (!) 474 lb 9.6 oz (215.3 kg)       Physical Exam   Constitutional: He is oriented to person, place, and time. He appears well-developed and well-nourished. HENT:   Head: Normocephalic and atraumatic.    Right Ear: Tympanic membrane Bilirubin      0.3 - 1.2 mg/dL  0.5   ALT      11 - 66 U/L  30   Cholesterol, Total      100 - 199 mg/dL  142   Triglycerides      0 - 199 mg/dL  179   HDL Cholesterol      mg/dL  31   LDL Calculated      mg/dL  75   Hemoglobin A1C      4.4 - 6.4 %  5.3   AVERAGE GLUCOSE      70 - 126 mg/dL  99   Vitamin B-12      211 - 911 pg/mL  422   Folate      4.8 - 24.2 ng/mL  11.0   Iron      65 - 195 ug/dL  82   TIBC      171 - 450 ug/dL  300   NICOTINE AND METABOLITES        SEE BELOW   Prealbumin      20.0 - 40.0 mg/dl  23.1   Ferritin      22 - 322 ng/mL  81   INR      0.85 - 1.13  1.03   Urine Drug Profile        SEE BELOW   RETINOL (VITAMIN A)        SEE BELOW   Vitamin B1,Whole Blood      70 - 180 nmol/L  120   Vit D, 25-Hydroxy      30 - 100 ng/ml 24 (L) 11 (L)   TSH      0.400 - 4.20 uIU/mL  1.110   Prostatic Specific Ag      0.00 - 1.00 ng/ml  1.03 (H)   Pth Intact      15.0 - 65.0 pg/mL  56.3   Est, Glom Filt Rate      ml/min/1.73m2  >90   Anion Gap      8.0 - 16.0 meq/L  12.0         Immunization History   Administered Date(s) Administered    Influenza Virus Vaccine 10/15/2015    Td 01/01/1999    Tdap (Boostrix, Adacel) 10/15/2015         Health Maintenance   Topic Date Due    HIV screen  09/14/1997    Flu vaccine (1) 09/01/2018    DTaP/Tdap/Td vaccine (3 - Td) 10/15/2025         ASSESSMENT      1. Annual physical exam    2. Need for influenza vaccination        PLAN        Orders Placed This Encounter   Procedures    INFLUENZA, QUADV, 3 YRS AND OLDER, IM, MDV, 0.5ML (Fadi Bran)     Letter of medical necessity done for his procedure    Wellness form completed for insurance. Cheng Weiss received counseling on the following healthy behaviors: nutrition and exercise    Patient given educational materials on wellness for age.             Electronically signed by Angela Miner MD on 9/20/2018 at 4:14 PM

## 2018-09-24 LAB — STATUS: NORMAL

## 2018-09-25 NOTE — PROGRESS NOTES
total recording time of 409.5 minutes, sleep period time  was 385.7 minutes and total sleep time was 337.5 minutes. Overall sleep  efficiency was 82.4%. The sleep onset latency was 23.8 minutes and wake  after sleep onset was 48.2 minutes and REM sleep latency was 96 minutes. SLEEP STAGING AND DISTRIBUTION SUMMARY:  Revealed the patient spent 41  minutes in stage I consisting of 12.1%, 270 minutes in stage II consisting  of 80%, 1 minute in stage III consisting of 0.3%, 25.5 minutes in REM sleep  consisting of 7.6% of total sleep time. RESPIRATORY EVENT ANALYSIS:  Revealed the patient had total of 209 apneas. Out of 209, 208 are obstructive and 1 is central in nature. The patient  also had total of 335 hypopneas, all of them are obstructive in nature. The total number of apneas and hypopneas recorded during the study were 544  with apnea-hypopnea index of 96.7. The patient's REM sleep apnea-hypopnea  index was 87.1. POSITION ANALYSIS:  Revealed the patient spent 219.5 minutes in supine  position, 118 minutes in left lateral position with a supine apnea-hypopnea  index was 99.5 whereas left lateral position apnea-hypopnea index was 98.6. PERIODIC LIMB MOVEMENT ANALYSIS:  Revealed the patient did not have any  periodic limb movements. The patient had total of 25 spontaneous arousals with a spontaneous arousal  index of 4.4. OXYGEN SATURATION MONITORING:  Revealed the patient had a maximum oxygen  desaturation to 66% with a mean oxygen saturation of 89%. The patient  spent a total of 132.4 minutes below oxygen saturation less than 88%. EKG MONITORING:  Revealed normal sinus rhythm with occasional premature  ventricular contractions. The patient found to have a soft to loud snoring during the sleep study. IMPRESSION:  1. Severe obstructive sleep apnea with worsening of respiratory events in  supine position. 2.  Decreased amount of REM sleep. 3.  Nocturnal hypoxia.   The patient spent a total of 132.4 minutes below  oxygen saturation less than 88%. 4.  Preoperative evaluation for planned bariatric surgery by Yessi Crane M.D. RECOMMENDATIONS:  1. For the patient's sleep-disordered breathing, the patient needs  treatment. 2.  If the patient chooses to go for CPAP titration as a treatment, the  patient should be scheduled for a CPAP titration and follow up in my clinic  in 6-8 weeks on recommended CPAP therapy for clinical reevaluation with  review of download. 3.  If the patient wished to discuss his sleep study reports, the patient  should be scheduled for followup in my clinic as soon as possible. Thanks to Yessi Crane M.D. for giving this opportunity to participate  in this pleasant gentleman.         Anuradha Ballesteros MD    D: 09/24/2018 18:59:59       T: 09/24/2018 19:01:51     [email or web address]  Job#: [id number]     Doc#: [id number]    CC:

## 2018-10-02 ENCOUNTER — ANESTHESIA (OUTPATIENT)
Dept: ENDOSCOPY | Age: 38
End: 2018-10-02
Payer: COMMERCIAL

## 2018-10-02 ENCOUNTER — ANESTHESIA EVENT (OUTPATIENT)
Dept: ENDOSCOPY | Age: 38
End: 2018-10-02
Payer: COMMERCIAL

## 2018-10-02 ENCOUNTER — HOSPITAL ENCOUNTER (OUTPATIENT)
Age: 38
Setting detail: OUTPATIENT SURGERY
Discharge: HOME | End: 2018-10-02
Attending: INTERNAL MEDICINE | Admitting: INTERNAL MEDICINE
Payer: COMMERCIAL

## 2018-10-02 VITALS
RESPIRATION RATE: 17 BRPM | SYSTOLIC BLOOD PRESSURE: 131 MMHG | TEMPERATURE: 98.2 F | WEIGHT: 315 LBS | HEIGHT: 72 IN | OXYGEN SATURATION: 100 % | BODY MASS INDEX: 42.66 KG/M2 | HEART RATE: 87 BPM | DIASTOLIC BLOOD PRESSURE: 77 MMHG

## 2018-10-02 VITALS — RESPIRATION RATE: 18 BRPM | OXYGEN SATURATION: 98 %

## 2018-10-02 PROCEDURE — 7100000000 HC PACU RECOVERY - FIRST 15 MIN: Performed by: INTERNAL MEDICINE

## 2018-10-02 PROCEDURE — 2580000003 HC RX 258: Performed by: INTERNAL MEDICINE

## 2018-10-02 PROCEDURE — 2709999900 HC NON-CHARGEABLE SUPPLY: Performed by: INTERNAL MEDICINE

## 2018-10-02 PROCEDURE — 3700000000 HC ANESTHESIA ATTENDED CARE: Performed by: INTERNAL MEDICINE

## 2018-10-02 PROCEDURE — 3700000001 HC ADD 15 MINUTES (ANESTHESIA): Performed by: INTERNAL MEDICINE

## 2018-10-02 PROCEDURE — 3609012400 HC EGD TRANSORAL BIOPSY SINGLE/MULTIPLE: Performed by: INTERNAL MEDICINE

## 2018-10-02 PROCEDURE — 2500000003 HC RX 250 WO HCPCS: Performed by: NURSE ANESTHETIST, CERTIFIED REGISTERED

## 2018-10-02 PROCEDURE — 6360000002 HC RX W HCPCS: Performed by: NURSE ANESTHETIST, CERTIFIED REGISTERED

## 2018-10-02 PROCEDURE — 7100000001 HC PACU RECOVERY - ADDTL 15 MIN: Performed by: INTERNAL MEDICINE

## 2018-10-02 PROCEDURE — 88305 TISSUE EXAM BY PATHOLOGIST: CPT

## 2018-10-02 RX ORDER — LIDOCAINE HYDROCHLORIDE 20 MG/ML
INJECTION, SOLUTION INFILTRATION; PERINEURAL PRN
Status: DISCONTINUED | OUTPATIENT
Start: 2018-10-02 | End: 2018-10-02 | Stop reason: SDUPTHER

## 2018-10-02 RX ORDER — SODIUM CHLORIDE 9 MG/ML
INJECTION, SOLUTION INTRAVENOUS CONTINUOUS PRN
Status: DISCONTINUED | OUTPATIENT
Start: 2018-10-02 | End: 2018-10-02

## 2018-10-02 RX ORDER — PROPOFOL 10 MG/ML
INJECTION, EMULSION INTRAVENOUS PRN
Status: DISCONTINUED | OUTPATIENT
Start: 2018-10-02 | End: 2018-10-02 | Stop reason: SDUPTHER

## 2018-10-02 RX ORDER — SODIUM CHLORIDE 450 MG/100ML
INJECTION, SOLUTION INTRAVENOUS CONTINUOUS
Status: DISCONTINUED | OUTPATIENT
Start: 2018-10-02 | End: 2018-10-02 | Stop reason: HOSPADM

## 2018-10-02 RX ADMIN — SODIUM CHLORIDE: 4.5 INJECTION, SOLUTION INTRAVENOUS at 14:04

## 2018-10-02 RX ADMIN — PROPOFOL 200 MG: 10 INJECTION, EMULSION INTRAVENOUS at 14:31

## 2018-10-02 RX ADMIN — LIDOCAINE HYDROCHLORIDE 120 MG: 20 INJECTION, SOLUTION INFILTRATION; PERINEURAL at 14:31

## 2018-10-02 ASSESSMENT — PAIN SCALES - GENERAL: PAINLEVEL_OUTOF10: 0

## 2018-10-02 ASSESSMENT — PAIN - FUNCTIONAL ASSESSMENT: PAIN_FUNCTIONAL_ASSESSMENT: 0-10

## 2018-10-02 NOTE — PROGRESS NOTES
Patient awake in recovery. Dr Joanne Rand reviewed findings with patient and family. Discharge instructions given and explained.

## 2018-10-04 ENCOUNTER — OFFICE VISIT (OUTPATIENT)
Dept: PULMONOLOGY | Age: 38
End: 2018-10-04
Payer: COMMERCIAL

## 2018-10-04 VITALS
BODY MASS INDEX: 42.66 KG/M2 | OXYGEN SATURATION: 93 % | HEIGHT: 72 IN | RESPIRATION RATE: 20 BRPM | HEART RATE: 92 BPM | SYSTOLIC BLOOD PRESSURE: 110 MMHG | DIASTOLIC BLOOD PRESSURE: 78 MMHG | WEIGHT: 315 LBS

## 2018-10-04 DIAGNOSIS — G47.33 OSA (OBSTRUCTIVE SLEEP APNEA): Primary | ICD-10-CM

## 2018-10-04 PROCEDURE — 99213 OFFICE O/P EST LOW 20 MIN: CPT | Performed by: INTERNAL MEDICINE

## 2018-10-04 NOTE — PATIENT INSTRUCTIONS
Recommendations/Plan:  -I had a discussion with patient regarding avialable treatment options for his sleep disorder breathing including but not limited to CPAP titration in the sleep lab Vs.Dental appliance placement with referral to a local dentist Vs other available surgical options including Uvulopalatopharyngoplasty, maxillomandibular ostomy and tracheostomy as last option. At the end of discussion, he decided to go for the CPAP titration. -he advised to keep good compliance with recommended positive airway pressure therapy to get optimal results and clinical improvement.  -Follow with my clinic in 6 to 8 weeks after starting on positive airway pressure therapy  for clinical reevaluation with review of download. Headvised to bring his CPAP/BiPAP/AutoSV machine or smart memory card from machine for download review.  -He was advised to call [employer] regarding supplies if needed.  -Hewas advised to loose weight by controlling diet and doing exercise. He is currently following with [facility] weight management center with Dr. Shravan Barbosa  -He was advised to call my office for earlier appointment if needed for worsening of sleep symptoms.  -Neftali Bond was educated about my impression and plan. He verbalizes understanding.

## 2018-10-09 ENCOUNTER — OFFICE VISIT (OUTPATIENT)
Dept: BARIATRICS/WEIGHT MGMT | Age: 38
End: 2018-10-09

## 2018-10-09 ENCOUNTER — OFFICE VISIT (OUTPATIENT)
Dept: BARIATRICS/WEIGHT MGMT | Age: 38
End: 2018-10-09
Payer: COMMERCIAL

## 2018-10-09 VITALS
SYSTOLIC BLOOD PRESSURE: 128 MMHG | HEART RATE: 100 BPM | TEMPERATURE: 98.4 F | HEIGHT: 72 IN | BODY MASS INDEX: 42.66 KG/M2 | WEIGHT: 315 LBS | DIASTOLIC BLOOD PRESSURE: 80 MMHG

## 2018-10-09 DIAGNOSIS — G47.33 OSA (OBSTRUCTIVE SLEEP APNEA): ICD-10-CM

## 2018-10-09 DIAGNOSIS — E66.01 MORBID OBESITY WITH BMI OF 60.0-69.9, ADULT (HCC): Primary | ICD-10-CM

## 2018-10-09 DIAGNOSIS — E55.9 VITAMIN D DEFICIENCY: ICD-10-CM

## 2018-10-09 DIAGNOSIS — E66.01 MORBID OBESITY DUE TO EXCESS CALORIES (HCC): Primary | ICD-10-CM

## 2018-10-09 PROCEDURE — 99213 OFFICE O/P EST LOW 20 MIN: CPT | Performed by: PHYSICIAN ASSISTANT

## 2018-10-09 RX ORDER — CHOLECALCIFEROL (VITAMIN D3) 1250 MCG
CAPSULE ORAL
Status: ON HOLD | COMMUNITY
End: 2018-12-27 | Stop reason: ALTCHOICE

## 2018-10-09 NOTE — PROGRESS NOTES
05/05/2018    RBC 5.24 05/05/2018    HGB 15.2 05/05/2018    HCT 44.5 05/05/2018    MCV 85.1 05/05/2018    MCH 29.0 05/05/2018    MCHC 34.1 05/05/2018    RDW 14.6 05/05/2018     05/05/2018    MPV 9.7 05/05/2018    SEGSPCT 63.9 05/05/2018    LABLYMP 25.4 05/05/2018    MONOPCT 7.7 05/05/2018    LABEOS 2.1 05/05/2018    BASO 0.9 05/05/2018    NRBC 0 05/05/2018    ANISOCYTOSIS 1+ 05/05/2018    SEGSABS 4.4 05/05/2018    LYMPHSABS 1.8 05/05/2018    MONOSABS 0.5 05/05/2018    EOSABS 0.1 05/05/2018    BASOSABS 0.1 05/05/2018        BMP/CMP   Lab Results   Component Value Date    GLUCOSE 94 05/05/2018    CREATININE 0.6 05/05/2018    BUN 15 05/05/2018     05/05/2018    K 4.0 05/05/2018     05/05/2018    CO2 25 05/05/2018    CALCIUM 9.0 05/05/2018    AST 19 05/05/2018    ALKPHOS 81 05/05/2018    PROT 7.5 05/05/2018    LABALBU 3.9 05/05/2018    BILITOT 0.5 05/05/2018    ALT 30 05/05/2018        PREALBUMIN   Lab Results   Component Value Date    PREALBUMIN 23.1 05/05/2018        VITAMIN B12   Lab Results   Component Value Date    [id number] 012 05/05/2018        VITAMIN D   Lab Results   Component Value Date    VITD25 24 09/11/2018        PTH  Lab Results   Component Value Date    IPTH 56.3 05/05/2018       VITAMIN B1/ THIAMINE   Lab Results   Component Value Date    [id number] 120 05/05/2018        LIPID SCREEN (FASTING)   Lab Results   Component Value Date    CHOL 142 05/05/2018    TRIG 179 05/05/2018    HDL 31 05/05/2018    [address] 75 05/05/2018   ,     HGA1C (T2DM ONLY)   Lab Results   Component Value Date    LABA1C 5.3 05/05/2018    AVGG 99 05/05/2018        TSH   Lab Results   Component Value Date    TSH 1.110 05/05/2018        IRON   Lab Results   Component Value Date    IRON 82 05/05/2018        TIBC  Lab Results   Component Value Date    TIBC 300 05/05/2018       FERRITIN  Lab Results   Component Value Date    FERRITIN 81 05/05/2018       VITAMIN A  Lab Results   Component Value Date    RETINOL SEE

## 2018-10-22 ENCOUNTER — OFFICE VISIT (OUTPATIENT)
Dept: BARIATRICS/WEIGHT MGMT | Age: 38
End: 2018-10-22
Payer: COMMERCIAL

## 2018-10-22 VITALS
DIASTOLIC BLOOD PRESSURE: 72 MMHG | HEART RATE: 100 BPM | WEIGHT: 315 LBS | TEMPERATURE: 98.8 F | SYSTOLIC BLOOD PRESSURE: 116 MMHG | BODY MASS INDEX: 42.66 KG/M2 | RESPIRATION RATE: 18 BRPM | HEIGHT: 72 IN

## 2018-10-22 DIAGNOSIS — E66.01 MORBID OBESITY (HCC): Primary | ICD-10-CM

## 2018-10-22 DIAGNOSIS — G47.33 OSA ON CPAP: ICD-10-CM

## 2018-10-22 DIAGNOSIS — Z99.89 OSA ON CPAP: ICD-10-CM

## 2018-10-22 DIAGNOSIS — E55.9 VITAMIN D DEFICIENCY: ICD-10-CM

## 2018-10-22 PROCEDURE — 99213 OFFICE O/P EST LOW 20 MIN: CPT | Performed by: SURGERY

## 2018-10-22 ASSESSMENT — ENCOUNTER SYMPTOMS
ALLERGIC/IMMUNOLOGIC NEGATIVE: 1
GASTROINTESTINAL NEGATIVE: 1
APNEA: 1
EYES NEGATIVE: 1

## 2018-10-22 NOTE — PROGRESS NOTES
Subjective:     Patient ID: Neisha Miller is a 41 y.o. male    Chief Complaint   Patient presents with    Follow-up     H&P Visit; initial [id number], last 467lb, current 466.8lb     HPI  Gordon Alegre is a 61-year-old male who presents for follow-up at the weight management program secondary to his morbid obesity. BMI 63. Current weight 466 pounds. He has lost 1 pound since last visit. He states he has not been able to lose enough adequate excess body weight with medical management and is wishing to proceed with a robotic sleeve gastrectomy. He has completed fitness orientation. Attended support groups. Completed psychology evaluation. He has been following with the dietitians. He states he is continuing to try to work on portion control and making better food selections. He has completed upper endoscopy. Trying to increase with exercise/fitness - working out at the fitness center at his work 3-4 times per week. Complains of shortness of breath with increased activity. Fatigue. Worsening joint and lower extremity muscle aching due to increased body weight. He states he is ready and excited to proceed with surgery so as to continue his weight loss journey. Review of Systems   Constitutional: Negative. HENT: Negative. Eyes: Negative. Respiratory: Positive for apnea. Cardiovascular: Negative. Gastrointestinal: Negative. Endocrine: Negative. Genitourinary: Negative. Musculoskeletal: Negative. Skin: Negative. Allergic/Immunologic: Negative. Neurological: Negative. Hematological: Negative. Psychiatric/Behavioral: Negative.       Past Medical History:   Diagnosis Date    Obesity     CATHLEEN on CPAP 10/22/2018       Past Surgical History:   Procedure Laterality Date    KNEE SURGERY Right     meniscus removal    UPPER GASTROINTESTINAL ENDOSCOPY N/A 10/2/2018    EGD BIOPSY performed by Brynn Ruggiero MD at [address] [place] [address]         Current Morbid [address] [address]    CATHLEEN on CPAP     Assessment:   1. Morbid obesity (BMI 63)  2. Sleep apnea  3. Vitamin D deficiency  4. Hiatal hernia  5. GERD  6. Esophagitis/gastritis    Plan:   1. Discussion again about the pros and cons of weight loss surgery. The risks benefits and alternatives to laparoscopic adjustable band, gastric sleeve and gastric Krishna-en-Y bypass were discussed in detail. The pros and cons of robotic assisted, laparoscopic and open techniques were discussed. 2.  Behavior modification discussed again in regards to dietary habits. 3.  Nutritional education occurred during visit. Follow up with dietitian for further evaluation - continue to follow their recommendations as directed. 4.  Options for medical management of morbid obesity discussed. 5.  Improvement in fitness/exercise discussed with patient and the need for this with/without surgery - continue at the fitness center at work. 6.  Medical necessity letter from PCP. 7.  Follow-up in one month at weight management program at [facility]. 8.  Signs and symptoms reviewed with patient that would be concerning and need him to return to office for re-evaluation. Patient states he will call if he has questions or concerns. 9. Multivitamin  10. Psychology evaluation completed. Follow-up as needed  11. EGD completed. Results reviewed with patient. Continue PPI. Obtain upper GI for further evaluation of GERD/hiatal hernia prior to surgery. 12.  Encouraged support groups  13. Send LOMN     --Patient states that he has not been able to lose enough adequate excess body weight with medical management only and would like to proceed with a robotic sleeve gastrectomy.     --More than 15 minutes spent with patient today. Greater than 50% of the time was involved with counseling, education and coordinating care.

## 2018-10-23 ENCOUNTER — TELEPHONE (OUTPATIENT)
Dept: BARIATRICS/WEIGHT MGMT | Age: 38
End: 2018-10-23

## 2018-10-23 DIAGNOSIS — K44.9 HIATAL HERNIA: Primary | ICD-10-CM

## 2018-10-24 ENCOUNTER — TELEPHONE (OUTPATIENT)
Dept: BARIATRICS/WEIGHT MGMT | Age: 38
End: 2018-10-24

## 2018-10-26 ENCOUNTER — HOSPITAL ENCOUNTER (OUTPATIENT)
Dept: SLEEP CENTER | Age: 38
Discharge: HOME | End: 2018-10-28
Payer: COMMERCIAL

## 2018-10-26 DIAGNOSIS — G47.33 OSA (OBSTRUCTIVE SLEEP APNEA): ICD-10-CM

## 2018-10-26 PROCEDURE — 95811 POLYSOM 6/>YRS CPAP 4/> PARM: CPT

## 2018-10-29 DIAGNOSIS — G47.33 OSA TREATED WITH BIPAP: Primary | ICD-10-CM

## 2018-10-29 LAB — STATUS: NORMAL

## 2018-10-30 ENCOUNTER — TELEPHONE (OUTPATIENT)
Dept: SLEEP CENTER | Age: 38
End: 2018-10-30

## 2018-11-07 ENCOUNTER — HOSPITAL ENCOUNTER (OUTPATIENT)
Dept: GENERAL RADIOLOGY | Age: 38
Discharge: HOME | End: 2018-11-07
Payer: COMMERCIAL

## 2018-11-07 DIAGNOSIS — K44.9 HIATAL HERNIA: ICD-10-CM

## 2018-11-07 PROCEDURE — 2500000003 HC RX 250 WO HCPCS: Performed by: SURGERY

## 2018-11-07 PROCEDURE — 6360000004 HC RX CONTRAST MEDICATION: Performed by: SURGERY

## 2018-11-07 PROCEDURE — 74246 X-RAY XM UPR GI TRC 2CNTRST: CPT

## 2018-11-07 PROCEDURE — A4641 RADIOPHARM DX AGENT NOC: HCPCS | Performed by: SURGERY

## 2018-11-07 PROCEDURE — 6370000000 HC RX 637 (ALT 250 FOR IP): Performed by: SURGERY

## 2018-11-07 RX ADMIN — BARIUM SULFATE 140 ML: 980 POWDER, FOR SUSPENSION ORAL at 08:26

## 2018-11-07 RX ADMIN — ANTACID/ANTIFLATULENT 1 EACH: 380; 550; 10; 10 GRANULE, EFFERVESCENT ORAL at 08:26

## 2018-11-07 RX ADMIN — BARIUM SULFATE 100 ML: 0.6 SUSPENSION ORAL at 08:26

## 2018-11-19 ENCOUNTER — TELEPHONE (OUTPATIENT)
Dept: BARIATRICS/WEIGHT MGMT | Age: 38
End: 2018-11-19

## 2018-11-26 ENCOUNTER — OFFICE VISIT (OUTPATIENT)
Dept: BARIATRICS/WEIGHT MGMT | Age: 38
End: 2018-11-26

## 2018-11-26 VITALS — WEIGHT: 315 LBS | BODY MASS INDEX: 42.66 KG/M2 | HEIGHT: 72 IN

## 2018-11-26 DIAGNOSIS — E66.01 MORBID OBESITY DUE TO EXCESS CALORIES (HCC): Primary | ICD-10-CM

## 2018-12-17 ENCOUNTER — HOSPITAL ENCOUNTER (OUTPATIENT)
Age: 38
Discharge: HOME | End: 2018-12-17
Payer: COMMERCIAL

## 2018-12-17 ENCOUNTER — HOSPITAL ENCOUNTER (OUTPATIENT)
Dept: GENERAL RADIOLOGY | Age: 38
Discharge: HOME | End: 2018-12-17
Payer: COMMERCIAL

## 2018-12-17 ENCOUNTER — OFFICE VISIT (OUTPATIENT)
Dept: BARIATRICS/WEIGHT MGMT | Age: 38
End: 2018-12-17

## 2018-12-17 DIAGNOSIS — G47.33 OSA ON CPAP: ICD-10-CM

## 2018-12-17 DIAGNOSIS — E66.01 MORBID OBESITY (HCC): ICD-10-CM

## 2018-12-17 DIAGNOSIS — Z99.89 OSA ON CPAP: ICD-10-CM

## 2018-12-17 DIAGNOSIS — E66.01 MORBID OBESITY DUE TO EXCESS CALORIES (HCC): Primary | ICD-10-CM

## 2018-12-17 LAB
ANION GAP SERPL CALCULATED.3IONS-SCNC: 11 MEQ/L (ref 8–16)
BASOPHILS # BLD: 0.4 %
BASOPHILS ABSOLUTE: 0 THOU/MM3 (ref 0–0.1)
BUN BLDV-MCNC: 16 MG/DL (ref 7–22)
CALCIUM SERPL-MCNC: 9.4 MG/DL (ref 8.5–10.5)
CHLORIDE BLD-SCNC: 102 MEQ/L (ref 98–111)
CO2: 28 MEQ/L (ref 23–33)
CREAT SERPL-MCNC: 0.6 MG/DL (ref 0.4–1.2)
EOSINOPHIL # BLD: 2.8 %
EOSINOPHILS ABSOLUTE: 0.2 THOU/MM3 (ref 0–0.4)
ERYTHROCYTE [DISTWIDTH] IN BLOOD BY AUTOMATED COUNT: 13.8 % (ref 11.5–14.5)
ERYTHROCYTE [DISTWIDTH] IN BLOOD BY AUTOMATED COUNT: 43.3 FL (ref 35–45)
GFR SERPL CREATININE-BSD FRML MDRD: > 90 ML/MIN/1.73M2
GLUCOSE BLD-MCNC: 97 MG/DL (ref 70–108)
HCT VFR BLD CALC: 46.6 % (ref 42–52)
HEMOGLOBIN: 15.1 GM/DL (ref 14–18)
IMMATURE GRANS (ABS): 0.02 THOU/MM3 (ref 0–0.07)
IMMATURE GRANULOCYTES: 0.3 %
LYMPHOCYTES # BLD: 29.2 %
LYMPHOCYTES ABSOLUTE: 2.1 THOU/MM3 (ref 1–4.8)
MCH RBC QN AUTO: 28.4 PG (ref 26–33)
MCHC RBC AUTO-ENTMCNC: 32.4 GM/DL (ref 32.2–35.5)
MCV RBC AUTO: 87.6 FL (ref 80–94)
MONOCYTES # BLD: 7.7 %
MONOCYTES ABSOLUTE: 0.6 THOU/MM3 (ref 0.4–1.3)
NUCLEATED RED BLOOD CELLS: 0 /100 WBC
PLATELET # BLD: 221 THOU/MM3 (ref 130–400)
PMV BLD AUTO: 11.3 FL (ref 9.4–12.4)
POTASSIUM SERPL-SCNC: 4.5 MEQ/L (ref 3.5–5.2)
RBC # BLD: 5.32 MILL/MM3 (ref 4.7–6.1)
SEG NEUTROPHILS: 59.6 %
SEGMENTED NEUTROPHILS ABSOLUTE COUNT: 4.3 THOU/MM3 (ref 1.8–7.7)
SODIUM BLD-SCNC: 141 MEQ/L (ref 135–145)
WBC # BLD: 7.2 THOU/MM3 (ref 4.8–10.8)

## 2018-12-17 PROCEDURE — 80048 BASIC METABOLIC PNL TOTAL CA: CPT

## 2018-12-17 PROCEDURE — 36415 COLL VENOUS BLD VENIPUNCTURE: CPT

## 2018-12-17 PROCEDURE — 85025 COMPLETE CBC W/AUTO DIFF WBC: CPT

## 2018-12-17 PROCEDURE — 71046 X-RAY EXAM CHEST 2 VIEWS: CPT

## 2018-12-17 NOTE — PROGRESS NOTES
Lucio Samayoa lost 8 lbs  since joining Weight Management Program January 2018. After nutrition evaluation and education, patient is considered to be a good surgical candidate from a MNT perspective. Patient's body composition for pre-op teaching class  was measured using the [facility] Scale. Results were saved and reviewed with the patient. Patient was educated on 2- week pre-operative nutrition protocol and post test completed. Pre-operative and post-operative diet guidelines were discussed and written information was provided. Nectar protein supplements were purchased. Vitamin regimen with Bariatric Advantage and/or Celebrate vitamins was explained, and patient purchased a 90 day supply at this visit. Importance of vitamin compliance was discussed and potential of vitamin deficiencies was reviewed. Encouraged continued physical activity. Patient signed agreement stating they are committed to lifelong follow up, smoking and alcohol cessation, vitamin compliance, and 2 week pre-operative dietary protocol.

## 2018-12-25 NOTE — H&P
Subjective:      Patient ID: Belem Perdue is a 41 y.o. male          Chief Complaint   Patient presents with    Follow-up       H&P Visit; initial 447CI, last 467lb, current 5.8lb      HPI  Alireza Franz is a 73-year-old male who presents for follow-up at the weight management program secondary to his morbid obesity. BMI 63. Current weight 466 pounds. He has lost 1 pound since last visit. He states he has not been able to lose enough adequate excess body weight with medical management and is wishing to proceed with a robotic sleeve gastrectomy. He has completed fitness orientation. Attended support groups. Completed psychology evaluation. He has been following with the dietitians. He states he is continuing to try to work on portion control and making better food selections. He has completed upper endoscopy. Trying to increase with exercise/fitness - working out at the fitness center at his work 3-4 times per week. Complains of shortness of breath with increased activity. Fatigue. Worsening joint and lower extremity muscle aching due to increased body weight. He states he is ready and excited to proceed with surgery so as to continue his weight loss journey.     Review of Systems   Constitutional: Negative. HENT: Negative. Eyes: Negative. Respiratory: Positive for apnea. Cardiovascular: Negative. Gastrointestinal: Negative. Endocrine: Negative. Genitourinary: Negative. Musculoskeletal: Negative. Skin: Negative. Allergic/Immunologic: Negative. Neurological: Negative. Hematological: Negative.     Psychiatric/Behavioral: Negative.       Past Medical History        Past Medical History:   Diagnosis Date    Obesity      CATHLEEN on CPAP 10/22/2018            Past Surgical History   Past Surgical History:   Procedure Laterality Date    KNEE SURGERY Right       meniscus removal    UPPER GASTROINTESTINAL ENDOSCOPY N/A 10/2/2018     EGD BIOPSY performed by Yumiko Goodrich oriented to person, place, and time. He has normal strength. No cranial nerve deficit or sensory deficit. Gait normal.   Skin: Skin is warm and dry. No abrasion, no bruising, no burn, no laceration and no rash noted. He is not diaphoretic. No cyanosis or erythema. No pallor. Nails show no clubbing. Psychiatric: He has a normal mood and affect.  His speech is normal and behavior is normal. Judgment and thought content normal. Cognition and memory are normal.      CBC         Lab Results   Component Value Date     WBC 6.9 05/05/2018     RBC 5.24 05/05/2018     HGB 15.2 05/05/2018     HCT 44.5 05/05/2018     MCV 85.1 05/05/2018     MCH 29.0 05/05/2018     MCHC 34.1 05/05/2018     RDW 14.6 05/05/2018      05/05/2018     MPV 9.7 05/05/2018     SEGSPCT 63.9 05/05/2018     LABLYMP 25.4 05/05/2018     MONOPCT 7.7 05/05/2018     LABEOS 2.1 05/05/2018     BASO 0.9 05/05/2018     NRBC 0 05/05/2018     ANISOCYTOSIS 1+ 05/05/2018     SEGSABS 4.4 05/05/2018     LYMPHSABS 1.8 05/05/2018     MONOSABS 0.5 05/05/2018     EOSABS 0.1 05/05/2018     BASOSABS 0.1 05/05/2018         BMP/CMP         Lab Results   Component Value Date     GLUCOSE 94 05/05/2018     CREATININE 0.6 05/05/2018     BUN 15 05/05/2018      05/05/2018     K 4.0 05/05/2018      05/05/2018     CO2 25 05/05/2018     CALCIUM 9.0 05/05/2018     AST 19 05/05/2018     ALKPHOS 81 05/05/2018     PROT 7.5 05/05/2018     LABALBU 3.9 05/05/2018     BILITOT 0.5 05/05/2018     ALT 30 05/05/2018         PREALBUMIN   Lab Results   Component Value Date     PREALBUMIN 23.1 05/05/2018         VITAMIN B12        Lab Results   Component Value Date     [id number] 422 05/05/2018         24 HOUR URINE CALCIUM   No results found for: Nigel Lorenzo CALCIUMUR      VITAMIN D         Lab Results   Component Value Date     VITD25 24 09/11/2018         VITAMIN B1/THIAMINE         Lab Results   Component Value Date     [id number] 120 05/05/2018         RBC FOLATE Lab Results   Component Value Date     FOLATE 11.0 2018         LIPID SCREEN (FASTING)         Lab Results   Component Value Date     CHOL 142 2018     TRIG 179 2018     HDL 31 2018     LDLCALC 75 2018   ,      HGA1C (T2DM ONLY)         Lab Results   Component Value Date     LABA1C 5.3 2018     AVGG 99 2018         TSH        Lab Results   Component Value Date     TSH 1.110 2018         IRON         Lab Results   Component Value Date     IRON 82 2018         IONIZED CALCIUM   No results found for: Meliton Gehrig      Imaging -      EGD:  OPERATIVE REPORT     PATIENT NAME: Taran Ching                 :          MED REC NO:   [id number]                           ROOMFreda Hasten NO:   [id number]                           ADMIT DATE: 10/02/2018  PROVIDER: Satish Davis M.D.        DATE OF PROCEDURE:  10/02/2018     SURGEON: Julián Tidwell M.D.     PROCEDURE PERFORMED:  EGD plus biopsy.     INDICATION FOR THE PROCEDURE:  The patient is here for screening for  preoperation for gastric sleeve.  See the recently dictated note for rest  of clinicals.     ASA CLASSIFICATION:  II.     MEDICATIONS:  Per Anesthesia.     BIOPSY:  Yes.     PHOTOGRAPH:  Yes.     DESCRIPTION OF PROCEDURE:  Informed consent was obtained after explaining  risks and benefits of the procedure and conscious sedation.  Possible  complications including bleeding, perforation, reaction to medicine but not  limiting to death were discussed.  Afterwards, the GIF-180 gastroscope was  advanced through oropharynx, esophagus, stomach, into the duodenum.  Normal  looking duodenal bulb and second portions.  Scope was withdrawn and the  patient had antral gastritis with some erosions.  Biopsies were taken to  check for Helicobacter and retroflex exam showed normal angularis, body of  the stomach, fundus and cardia.  Hiatal hernia was seen.  On top of hiatal  hernia, irregular GE junction inflammation or architectural distortion. Atrophy and Helicobacter organisms are not seen. Paolo Bullocks is no evidence  of intestinal metaplasia, dysplasia or malignancy.                                                 <Sign Out Dr. Vallecillo Co                                             Alana Ortiz D.O., F.C.A.P.         Patient Active Problem List   Diagnosis    Morbid obesity ([address]    CATHLEEN on CPAP      Assessment:   1. Morbid obesity (BMI 63)  2. Sleep apnea  3. Vitamin D deficiency  4. Hiatal hernia  5. GERD  6. Esophagitis/gastritis     Plan:   1. Discussion again about the pros and cons of weight loss surgery.  The risks benefits and alternatives to laparoscopic adjustable band, gastric sleeve and gastric Krishna-en-Y bypass were discussed in detail.  The pros and cons of robotic assisted, laparoscopic and open techniques were discussed. 2.  Behavior modification discussed again in regards to dietary habits. 3.  Nutritional education occurred during visit.  Follow up with dietitian for further evaluation - continue to follow their recommendations as directed. 4.  Options for medical management of morbid obesity discussed. 5.  Improvement in fitness/exercise discussed with patient and the need for this with/without surgery - continue at the fitness center at work. 6.  Medical necessity letter from PCP. 7.  Follow-up in one month at weight management program at [facility]. 8.  Signs and symptoms reviewed with patient that would be concerning and need him to return to office for re-evaluation. Patient states he will call if he has questions or concerns.   9. Multivitamin  10. Psychology evaluation completed. Follow-up as needed  11. EGD completed. Results reviewed with patient. Continue PPI. Obtain upper GI for further evaluation of GERD/hiatal hernia prior to surgery.   12.  Encouraged support groups  13.  Send LOMN     --Patient states that he has not been able to lose enough adequate

## 2018-12-27 ENCOUNTER — ANESTHESIA EVENT (OUTPATIENT)
Dept: OPERATING ROOM | Age: 38
End: 2018-12-27
Payer: COMMERCIAL

## 2018-12-27 ENCOUNTER — HOSPITAL ENCOUNTER (OUTPATIENT)
Age: 38
Discharge: HOME | End: 2018-12-29
Attending: SURGERY | Admitting: SURGERY
Payer: COMMERCIAL

## 2018-12-27 ENCOUNTER — ANESTHESIA (OUTPATIENT)
Dept: OPERATING ROOM | Age: 38
End: 2018-12-27
Payer: COMMERCIAL

## 2018-12-27 VITALS
OXYGEN SATURATION: 86 % | DIASTOLIC BLOOD PRESSURE: 56 MMHG | RESPIRATION RATE: 1 BRPM | SYSTOLIC BLOOD PRESSURE: 94 MMHG

## 2018-12-27 DIAGNOSIS — Z98.84 S/P LAPAROSCOPIC SLEEVE GASTRECTOMY: Primary | ICD-10-CM

## 2018-12-27 DIAGNOSIS — E66.01 MORBID OBESITY WITH BMI OF 60.0-69.9, ADULT (HCC): ICD-10-CM

## 2018-12-27 PROBLEM — E66.9 OBESITY: Status: ACTIVE | Noted: 2018-12-27

## 2018-12-27 PROCEDURE — 6360000002 HC RX W HCPCS: Performed by: NURSE ANESTHETIST, CERTIFIED REGISTERED

## 2018-12-27 PROCEDURE — 96374 THER/PROPH/DIAG INJ IV PUSH: CPT

## 2018-12-27 PROCEDURE — 2709999900 HC NON-CHARGEABLE SUPPLY: Performed by: SURGERY

## 2018-12-27 PROCEDURE — 6360000002 HC RX W HCPCS: Performed by: ANESTHESIOLOGY

## 2018-12-27 PROCEDURE — 2500000003 HC RX 250 WO HCPCS: Performed by: NURSE ANESTHETIST, CERTIFIED REGISTERED

## 2018-12-27 PROCEDURE — 88300 SURGICAL PATH GROSS: CPT

## 2018-12-27 PROCEDURE — 43775 LAP SLEEVE GASTRECTOMY: CPT | Performed by: SURGERY

## 2018-12-27 PROCEDURE — 2580000003 HC RX 258: Performed by: SURGERY

## 2018-12-27 PROCEDURE — 6360000002 HC RX W HCPCS: Performed by: SURGERY

## 2018-12-27 PROCEDURE — 2780000010 HC IMPLANT OTHER: Performed by: SURGERY

## 2018-12-27 PROCEDURE — 2720000010 HC SURG SUPPLY STERILE: Performed by: SURGERY

## 2018-12-27 PROCEDURE — 96360 HYDRATION IV INFUSION INIT: CPT

## 2018-12-27 PROCEDURE — 6370000000 HC RX 637 (ALT 250 FOR IP): Performed by: SURGERY

## 2018-12-27 PROCEDURE — L0625 LO FLEX L1-BELOW L5 PRE OTS: HCPCS | Performed by: SURGERY

## 2018-12-27 PROCEDURE — 2500000003 HC RX 250 WO HCPCS: Performed by: SURGERY

## 2018-12-27 PROCEDURE — 3600000019 HC SURGERY ROBOT ADDTL 15MIN: Performed by: SURGERY

## 2018-12-27 PROCEDURE — 3700000001 HC ADD 15 MINUTES (ANESTHESIA): Performed by: SURGERY

## 2018-12-27 PROCEDURE — 7100000001 HC PACU RECOVERY - ADDTL 15 MIN: Performed by: SURGERY

## 2018-12-27 PROCEDURE — C9113 INJ PANTOPRAZOLE SODIUM, VIA: HCPCS | Performed by: SURGERY

## 2018-12-27 PROCEDURE — 7100000000 HC PACU RECOVERY - FIRST 15 MIN: Performed by: SURGERY

## 2018-12-27 PROCEDURE — S0028 INJECTION, FAMOTIDINE, 20 MG: HCPCS | Performed by: SURGERY

## 2018-12-27 PROCEDURE — S2900 ROBOTIC SURGICAL SYSTEM: HCPCS | Performed by: SURGERY

## 2018-12-27 PROCEDURE — 96375 TX/PRO/DX INJ NEW DRUG ADDON: CPT

## 2018-12-27 PROCEDURE — 3700000000 HC ANESTHESIA ATTENDED CARE: Performed by: SURGERY

## 2018-12-27 PROCEDURE — 3600000009 HC SURGERY ROBOT BASE: Performed by: SURGERY

## 2018-12-27 DEVICE — SEALANT HEMSTAT 5ML HUM FIBRIN THROM 2 VI APPL DEV EVICEL: Type: IMPLANTABLE DEVICE | Site: ABDOMEN | Status: FUNCTIONAL

## 2018-12-27 RX ORDER — SODIUM CHLORIDE 9 MG/ML
INJECTION, SOLUTION INTRAVENOUS CONTINUOUS
Status: DISCONTINUED | OUTPATIENT
Start: 2018-12-27 | End: 2018-12-27

## 2018-12-27 RX ORDER — SODIUM CHLORIDE 0.9 % (FLUSH) 0.9 %
10 SYRINGE (ML) INJECTION EVERY 12 HOURS SCHEDULED
Status: DISCONTINUED | OUTPATIENT
Start: 2018-12-27 | End: 2018-12-29 | Stop reason: HOSPADM

## 2018-12-27 RX ORDER — PROMETHAZINE HYDROCHLORIDE 25 MG/ML
6.25 INJECTION, SOLUTION INTRAMUSCULAR; INTRAVENOUS EVERY 6 HOURS PRN
Status: DISCONTINUED | OUTPATIENT
Start: 2018-12-27 | End: 2018-12-29 | Stop reason: HOSPADM

## 2018-12-27 RX ORDER — MORPHINE SULFATE 4 MG/ML
4 INJECTION, SOLUTION INTRAMUSCULAR; INTRAVENOUS
Status: DISCONTINUED | OUTPATIENT
Start: 2018-12-27 | End: 2018-12-29 | Stop reason: HOSPADM

## 2018-12-27 RX ORDER — ONDANSETRON 2 MG/ML
4 INJECTION INTRAMUSCULAR; INTRAVENOUS EVERY 6 HOURS
Status: DISCONTINUED | OUTPATIENT
Start: 2018-12-27 | End: 2018-12-29 | Stop reason: HOSPADM

## 2018-12-27 RX ORDER — PROMETHAZINE HYDROCHLORIDE 25 MG/1
25 SUPPOSITORY RECTAL EVERY 6 HOURS PRN
Status: DISCONTINUED | OUTPATIENT
Start: 2018-12-27 | End: 2018-12-29 | Stop reason: HOSPADM

## 2018-12-27 RX ORDER — ACETAMINOPHEN 325 MG/1
650 TABLET ORAL EVERY 4 HOURS PRN
Status: DISCONTINUED | OUTPATIENT
Start: 2018-12-27 | End: 2018-12-29 | Stop reason: HOSPADM

## 2018-12-27 RX ORDER — FENTANYL CITRATE 50 UG/ML
50 INJECTION, SOLUTION INTRAMUSCULAR; INTRAVENOUS EVERY 5 MIN PRN
Status: DISCONTINUED | OUTPATIENT
Start: 2018-12-27 | End: 2018-12-27 | Stop reason: HOSPADM

## 2018-12-27 RX ORDER — SCOLOPAMINE TRANSDERMAL SYSTEM 1 MG/1
1 PATCH, EXTENDED RELEASE TRANSDERMAL
Status: DISCONTINUED | OUTPATIENT
Start: 2018-12-27 | End: 2018-12-27

## 2018-12-27 RX ORDER — ROCURONIUM BROMIDE 10 MG/ML
INJECTION, SOLUTION INTRAVENOUS PRN
Status: DISCONTINUED | OUTPATIENT
Start: 2018-12-27 | End: 2018-12-27 | Stop reason: SDUPTHER

## 2018-12-27 RX ORDER — SUCCINYLCHOLINE/SOD CL,ISO/PF 100 MG/5ML
SYRINGE (ML) INTRAVENOUS PRN
Status: DISCONTINUED | OUTPATIENT
Start: 2018-12-27 | End: 2018-12-27 | Stop reason: SDUPTHER

## 2018-12-27 RX ORDER — HYOSCYAMINE SULFATE 0.125 MG
125 TABLET,DISINTEGRATING ORAL EVERY 4 HOURS PRN
Status: DISCONTINUED | OUTPATIENT
Start: 2018-12-27 | End: 2018-12-29 | Stop reason: HOSPADM

## 2018-12-27 RX ORDER — SCOLOPAMINE TRANSDERMAL SYSTEM 1 MG/1
1 PATCH, EXTENDED RELEASE TRANSDERMAL
Status: DISCONTINUED | OUTPATIENT
Start: 2018-12-27 | End: 2018-12-29 | Stop reason: HOSPADM

## 2018-12-27 RX ORDER — FENTANYL CITRATE 50 UG/ML
INJECTION, SOLUTION INTRAMUSCULAR; INTRAVENOUS PRN
Status: DISCONTINUED | OUTPATIENT
Start: 2018-12-27 | End: 2018-12-27 | Stop reason: SDUPTHER

## 2018-12-27 RX ORDER — LABETALOL HYDROCHLORIDE 5 MG/ML
10 INJECTION, SOLUTION INTRAVENOUS EVERY 10 MIN PRN
Status: DISCONTINUED | OUTPATIENT
Start: 2018-12-27 | End: 2018-12-27 | Stop reason: HOSPADM

## 2018-12-27 RX ORDER — PANTOPRAZOLE SODIUM 40 MG/10ML
40 INJECTION, POWDER, LYOPHILIZED, FOR SOLUTION INTRAVENOUS DAILY
Status: DISCONTINUED | OUTPATIENT
Start: 2018-12-27 | End: 2018-12-29 | Stop reason: HOSPADM

## 2018-12-27 RX ORDER — PROMETHAZINE HYDROCHLORIDE 25 MG/ML
12.5 INJECTION, SOLUTION INTRAMUSCULAR; INTRAVENOUS
Status: COMPLETED | OUTPATIENT
Start: 2018-12-27 | End: 2018-12-27

## 2018-12-27 RX ORDER — BUPIVACAINE HYDROCHLORIDE AND EPINEPHRINE 5; 5 MG/ML; UG/ML
INJECTION, SOLUTION EPIDURAL; INTRACAUDAL; PERINEURAL PRN
Status: DISCONTINUED | OUTPATIENT
Start: 2018-12-27 | End: 2018-12-27 | Stop reason: HOSPADM

## 2018-12-27 RX ORDER — SODIUM CHLORIDE 0.9 % (FLUSH) 0.9 %
10 SYRINGE (ML) INJECTION PRN
Status: DISCONTINUED | OUTPATIENT
Start: 2018-12-27 | End: 2018-12-29 | Stop reason: HOSPADM

## 2018-12-27 RX ORDER — ONDANSETRON 2 MG/ML
4 INJECTION INTRAMUSCULAR; INTRAVENOUS ONCE
Status: COMPLETED | OUTPATIENT
Start: 2018-12-27 | End: 2018-12-27

## 2018-12-27 RX ORDER — PROPOFOL 10 MG/ML
INJECTION, EMULSION INTRAVENOUS PRN
Status: DISCONTINUED | OUTPATIENT
Start: 2018-12-27 | End: 2018-12-27 | Stop reason: SDUPTHER

## 2018-12-27 RX ORDER — METOCLOPRAMIDE HYDROCHLORIDE 5 MG/ML
10 INJECTION INTRAMUSCULAR; INTRAVENOUS EVERY 6 HOURS PRN
Status: DISCONTINUED | OUTPATIENT
Start: 2018-12-27 | End: 2018-12-29 | Stop reason: HOSPADM

## 2018-12-27 RX ORDER — SODIUM CHLORIDE 0.9 % (FLUSH) 0.9 %
10 SYRINGE (ML) INJECTION EVERY 12 HOURS SCHEDULED
Status: DISCONTINUED | OUTPATIENT
Start: 2018-12-27 | End: 2018-12-27 | Stop reason: HOSPADM

## 2018-12-27 RX ORDER — KETOROLAC TROMETHAMINE 30 MG/ML
30 INJECTION, SOLUTION INTRAMUSCULAR; INTRAVENOUS EVERY 8 HOURS
Status: DISCONTINUED | OUTPATIENT
Start: 2018-12-27 | End: 2018-12-29 | Stop reason: HOSPADM

## 2018-12-27 RX ORDER — 0.9 % SODIUM CHLORIDE 0.9 %
10 VIAL (ML) INJECTION DAILY
Status: DISCONTINUED | OUTPATIENT
Start: 2018-12-27 | End: 2018-12-29 | Stop reason: HOSPADM

## 2018-12-27 RX ORDER — SODIUM CHLORIDE 0.9 % (FLUSH) 0.9 %
10 SYRINGE (ML) INJECTION PRN
Status: DISCONTINUED | OUTPATIENT
Start: 2018-12-27 | End: 2018-12-27 | Stop reason: HOSPADM

## 2018-12-27 RX ORDER — SODIUM CHLORIDE 9 MG/ML
INJECTION, SOLUTION INTRAVENOUS CONTINUOUS
Status: DISCONTINUED | OUTPATIENT
Start: 2018-12-27 | End: 2018-12-29 | Stop reason: HOSPADM

## 2018-12-27 RX ORDER — GLYCOPYRROLATE 1 MG/5 ML
SYRINGE (ML) INTRAVENOUS PRN
Status: DISCONTINUED | OUTPATIENT
Start: 2018-12-27 | End: 2018-12-27 | Stop reason: SDUPTHER

## 2018-12-27 RX ORDER — MORPHINE SULFATE 2 MG/ML
2 INJECTION, SOLUTION INTRAMUSCULAR; INTRAVENOUS
Status: DISCONTINUED | OUTPATIENT
Start: 2018-12-27 | End: 2018-12-29 | Stop reason: HOSPADM

## 2018-12-27 RX ADMIN — FENTANYL CITRATE 50 MCG: 50 INJECTION INTRAMUSCULAR; INTRAVENOUS at 17:45

## 2018-12-27 RX ADMIN — ROCURONIUM BROMIDE 50 MG: 10 INJECTION INTRAVENOUS at 15:49

## 2018-12-27 RX ADMIN — KETOROLAC TROMETHAMINE 30 MG: 30 INJECTION, SOLUTION INTRAMUSCULAR at 20:24

## 2018-12-27 RX ADMIN — CEFOXITIN SODIUM 1 G: 1 POWDER, FOR SOLUTION INTRAVENOUS at 15:49

## 2018-12-27 RX ADMIN — SODIUM CHLORIDE: 9 INJECTION, SOLUTION INTRAVENOUS at 20:37

## 2018-12-27 RX ADMIN — HYDROMORPHONE HYDROCHLORIDE 0.25 MG: 1 INJECTION, SOLUTION INTRAMUSCULAR; INTRAVENOUS; SUBCUTANEOUS at 17:40

## 2018-12-27 RX ADMIN — PANTOPRAZOLE SODIUM 40 MG: 40 INJECTION, POWDER, FOR SOLUTION INTRAVENOUS at 20:31

## 2018-12-27 RX ADMIN — PHENYLEPHRINE HYDROCHLORIDE 200 MCG: 10 INJECTION INTRAVENOUS at 16:04

## 2018-12-27 RX ADMIN — ONDANSETRON 4 MG: 2 INJECTION INTRAMUSCULAR; INTRAVENOUS at 17:29

## 2018-12-27 RX ADMIN — Medication 0.4 MG: at 16:00

## 2018-12-27 RX ADMIN — PROPOFOL 200 MG: 10 INJECTION, EMULSION INTRAVENOUS at 15:41

## 2018-12-27 RX ADMIN — Medication 10 ML: at 20:30

## 2018-12-27 RX ADMIN — ONDANSETRON 4 MG: 2 INJECTION INTRAMUSCULAR; INTRAVENOUS at 23:35

## 2018-12-27 RX ADMIN — HYDROMORPHONE HYDROCHLORIDE 0.25 MG: 1 INJECTION, SOLUTION INTRAMUSCULAR; INTRAVENOUS; SUBCUTANEOUS at 17:22

## 2018-12-27 RX ADMIN — PHENYLEPHRINE HYDROCHLORIDE 200 MCG: 10 INJECTION INTRAVENOUS at 16:06

## 2018-12-27 RX ADMIN — PHENYLEPHRINE HYDROCHLORIDE 200 MCG: 10 INJECTION INTRAVENOUS at 16:24

## 2018-12-27 RX ADMIN — MORPHINE SULFATE 2 MG: 2 INJECTION, SOLUTION INTRAMUSCULAR; INTRAVENOUS at 23:35

## 2018-12-27 RX ADMIN — FENTANYL CITRATE 100 MCG: 50 INJECTION INTRAMUSCULAR; INTRAVENOUS at 17:04

## 2018-12-27 RX ADMIN — Medication 200 MG: at 15:41

## 2018-12-27 RX ADMIN — PHENYLEPHRINE HYDROCHLORIDE 200 MCG: 10 INJECTION INTRAVENOUS at 16:34

## 2018-12-27 RX ADMIN — FAMOTIDINE 20 MG: 10 INJECTION, SOLUTION INTRAVENOUS at 14:56

## 2018-12-27 RX ADMIN — HYOSCYAMINE SULFATE 125 MCG: 0.12 TABLET, ORALLY DISINTEGRATING ORAL at 20:24

## 2018-12-27 RX ADMIN — PROMETHAZINE HYDROCHLORIDE 12.5 MG: 25 INJECTION INTRAMUSCULAR; INTRAVENOUS at 17:50

## 2018-12-27 RX ADMIN — PHENYLEPHRINE HYDROCHLORIDE 200 MCG: 10 INJECTION INTRAVENOUS at 16:02

## 2018-12-27 RX ADMIN — FENTANYL CITRATE 150 MCG: 50 INJECTION INTRAMUSCULAR; INTRAVENOUS at 15:41

## 2018-12-27 RX ADMIN — ONDANSETRON 4 MG: 2 INJECTION INTRAMUSCULAR; INTRAVENOUS at 20:28

## 2018-12-27 RX ADMIN — SODIUM CHLORIDE: 9 INJECTION, SOLUTION INTRAVENOUS at 14:56

## 2018-12-27 RX ADMIN — FENTANYL CITRATE 50 MCG: 50 INJECTION INTRAMUSCULAR; INTRAVENOUS at 17:30

## 2018-12-27 RX ADMIN — HYDROMORPHONE HYDROCHLORIDE 0.25 MG: 1 INJECTION, SOLUTION INTRAMUSCULAR; INTRAVENOUS; SUBCUTANEOUS at 17:36

## 2018-12-27 RX ADMIN — SUGAMMADEX 400 MG: 100 INJECTION, SOLUTION INTRAVENOUS at 16:57

## 2018-12-27 RX ADMIN — SODIUM CHLORIDE: 9 INJECTION, SOLUTION INTRAVENOUS at 16:20

## 2018-12-27 RX ADMIN — HYDROMORPHONE HYDROCHLORIDE 0.25 MG: 1 INJECTION, SOLUTION INTRAMUSCULAR; INTRAVENOUS; SUBCUTANEOUS at 17:15

## 2018-12-27 RX ADMIN — PHENYLEPHRINE HYDROCHLORIDE 200 MCG: 10 INJECTION INTRAVENOUS at 16:00

## 2018-12-27 ASSESSMENT — PAIN SCALES - GENERAL
PAINLEVEL_OUTOF10: 0
PAINLEVEL_OUTOF10: 7
PAINLEVEL_OUTOF10: 7
PAINLEVEL_OUTOF10: 6
PAINLEVEL_OUTOF10: 5
PAINLEVEL_OUTOF10: 8
PAINLEVEL_OUTOF10: 7
PAINLEVEL_OUTOF10: 8
PAINLEVEL_OUTOF10: 7
PAINLEVEL_OUTOF10: 6
PAINLEVEL_OUTOF10: 5
PAINLEVEL_OUTOF10: 7
PAINLEVEL_OUTOF10: 6
PAINLEVEL_OUTOF10: 7

## 2018-12-27 ASSESSMENT — PULMONARY FUNCTION TESTS
PIF_VALUE: 24
PIF_VALUE: 28
PIF_VALUE: 21
PIF_VALUE: 25
PIF_VALUE: 27
PIF_VALUE: 0
PIF_VALUE: 28
PIF_VALUE: 34
PIF_VALUE: 27
PIF_VALUE: 27
PIF_VALUE: 31
PIF_VALUE: 7
PIF_VALUE: 24
PIF_VALUE: 25
PIF_VALUE: 26
PIF_VALUE: 27
PIF_VALUE: 28
PIF_VALUE: 28
PIF_VALUE: 24
PIF_VALUE: 24
PIF_VALUE: 26
PIF_VALUE: 27
PIF_VALUE: 31
PIF_VALUE: 7
PIF_VALUE: 26
PIF_VALUE: 25
PIF_VALUE: 28
PIF_VALUE: 24
PIF_VALUE: 28
PIF_VALUE: 27
PIF_VALUE: 29
PIF_VALUE: 25
PIF_VALUE: 27
PIF_VALUE: 2
PIF_VALUE: 22
PIF_VALUE: 28
PIF_VALUE: 25
PIF_VALUE: 27
PIF_VALUE: 25
PIF_VALUE: 27
PIF_VALUE: 27
PIF_VALUE: 26
PIF_VALUE: 2
PIF_VALUE: 29
PIF_VALUE: 1
PIF_VALUE: 25
PIF_VALUE: 28
PIF_VALUE: 25
PIF_VALUE: 29
PIF_VALUE: 28
PIF_VALUE: 27
PIF_VALUE: 27
PIF_VALUE: 25
PIF_VALUE: 32
PIF_VALUE: 31
PIF_VALUE: 27
PIF_VALUE: 28
PIF_VALUE: 25
PIF_VALUE: 28
PIF_VALUE: 28
PIF_VALUE: 31
PIF_VALUE: 24
PIF_VALUE: 23
PIF_VALUE: 31
PIF_VALUE: 27
PIF_VALUE: 24
PIF_VALUE: 27
PIF_VALUE: 28
PIF_VALUE: 24
PIF_VALUE: 28
PIF_VALUE: 24
PIF_VALUE: 28
PIF_VALUE: 27
PIF_VALUE: 24
PIF_VALUE: 27
PIF_VALUE: 2
PIF_VALUE: 29
PIF_VALUE: 29
PIF_VALUE: 28
PIF_VALUE: 25

## 2018-12-27 ASSESSMENT — PAIN DESCRIPTION - LOCATION
LOCATION: ABDOMEN
LOCATION: ABDOMEN

## 2018-12-27 ASSESSMENT — PAIN DESCRIPTION - ORIENTATION
ORIENTATION: MID
ORIENTATION: MID

## 2018-12-27 ASSESSMENT — PAIN DESCRIPTION - DESCRIPTORS
DESCRIPTORS: ACHING
DESCRIPTORS: ACHING

## 2018-12-27 ASSESSMENT — PAIN DESCRIPTION - PAIN TYPE
TYPE: SURGICAL PAIN
TYPE: SURGICAL PAIN

## 2018-12-27 NOTE — ANESTHESIA PRE PROCEDURE
Department of Anesthesiology  Preprocedure Note       Name:  Joseph Monteiro   Age:  41 y.o.  :  1980                                          MRN:  [id number]         Date:  2018      Surgeon: Erika Roberson):  Sarah Tan MD    Procedure: ROBOTIC GASTRECTOMY SLEEVE (N/A Abdomen)    Medications prior to admission:   Prior to Admission medications    Not on File       Current medications:    Current Facility-Administered Medications   Medication Dose Route Frequency Provider Last Rate Last Dose    sodium chloride flush 0.9 % injection 10 mL  10 mL Intravenous 2 times per day Sarah Tan MD        sodium chloride flush 0.9 % injection 10 mL  10 mL Intravenous PRN Sarah Tan MD        0.9 % sodium chloride infusion   Intravenous Continuous Sarah Tan  mL/hr at 18 1456      cefOXitin (MEFOXIN) 2 g in dextrose 5% 50 mL (mini-bag)  2 g Intravenous On Call to [address] [place] [address], MD        ondansetron [facility] injection 4 mg  4 mg Intravenous Once Sarah Tan MD        scopolamine (TRANSDERM-SCOP) transdermal patch 1 patch  1 patch Transdermal Q72H Sarah Tan MD   1 patch at 18 1456       Allergies:  No Known Allergies    Problem List:    Patient Active Problem List   Diagnosis Code    Morbid [address] [address] E66.01    CATHLEEN on CPAP G47.33, Z99.89    Obesity E66.9       Past Medical History:        Diagnosis Date    Obesity     CATHLEEN on CPAP 10/22/2018       Past Surgical History:        Procedure Laterality Date    KNEE SURGERY Right     meniscus removal    UPPER GASTROINTESTINAL ENDOSCOPY N/A 10/2/2018    EGD BIOPSY performed by Bettina Will MD at [facility]    WISDOM TOOTH EXTRACTION         Social History:    Social History   Substance Use Topics    Smoking status: Never Smoker    Smokeless tobacco: Never Used    Alcohol use 3.6 oz/week     6 Cans of beer per week      Comment: every couple of months

## 2018-12-28 ENCOUNTER — APPOINTMENT (OUTPATIENT)
Dept: GENERAL RADIOLOGY | Age: 38
End: 2018-12-28
Attending: SURGERY
Payer: COMMERCIAL

## 2018-12-28 LAB
ANION GAP SERPL CALCULATED.3IONS-SCNC: 17 MEQ/L (ref 8–16)
BUN BLDV-MCNC: 14 MG/DL (ref 7–22)
CALCIUM SERPL-MCNC: 8.6 MG/DL (ref 8.5–10.5)
CHLORIDE BLD-SCNC: 105 MEQ/L (ref 98–111)
CO2: 20 MEQ/L (ref 23–33)
CREAT SERPL-MCNC: 0.6 MG/DL (ref 0.4–1.2)
GFR SERPL CREATININE-BSD FRML MDRD: > 90 ML/MIN/1.73M2
GLUCOSE BLD-MCNC: 122 MG/DL (ref 70–108)
HCT VFR BLD CALC: 45.3 % (ref 42–52)
HEMOGLOBIN: 14.5 GM/DL (ref 14–18)
POTASSIUM REFLEX MAGNESIUM: 4.4 MEQ/L (ref 3.5–5.2)
SODIUM BLD-SCNC: 142 MEQ/L (ref 135–145)

## 2018-12-28 PROCEDURE — A4641 RADIOPHARM DX AGENT NOC: HCPCS | Performed by: SURGERY

## 2018-12-28 PROCEDURE — 96361 HYDRATE IV INFUSION ADD-ON: CPT

## 2018-12-28 PROCEDURE — 80048 BASIC METABOLIC PNL TOTAL CA: CPT

## 2018-12-28 PROCEDURE — 85014 HEMATOCRIT: CPT

## 2018-12-28 PROCEDURE — 74240 X-RAY XM UPR GI TRC 1CNTRST: CPT

## 2018-12-28 PROCEDURE — 96376 TX/PRO/DX INJ SAME DRUG ADON: CPT

## 2018-12-28 PROCEDURE — APPSS30 APP SPLIT SHARED TIME 16-30 MINUTES: Performed by: NURSE PRACTITIONER

## 2018-12-28 PROCEDURE — 6360000002 HC RX W HCPCS: Performed by: SURGERY

## 2018-12-28 PROCEDURE — 85018 HEMOGLOBIN: CPT

## 2018-12-28 PROCEDURE — 6360000004 HC RX CONTRAST MEDICATION: Performed by: SURGERY

## 2018-12-28 PROCEDURE — 36415 COLL VENOUS BLD VENIPUNCTURE: CPT

## 2018-12-28 PROCEDURE — 99024 POSTOP FOLLOW-UP VISIT: CPT | Performed by: NURSE PRACTITIONER

## 2018-12-28 PROCEDURE — 2580000003 HC RX 258: Performed by: SURGERY

## 2018-12-28 PROCEDURE — C9113 INJ PANTOPRAZOLE SODIUM, VIA: HCPCS | Performed by: SURGERY

## 2018-12-28 PROCEDURE — 94761 N-INVAS EAR/PLS OXIMETRY MLT: CPT

## 2018-12-28 PROCEDURE — 6370000000 HC RX 637 (ALT 250 FOR IP): Performed by: SURGERY

## 2018-12-28 PROCEDURE — 6370000000 HC RX 637 (ALT 250 FOR IP): Performed by: NURSE PRACTITIONER

## 2018-12-28 RX ORDER — OXYCODONE HYDROCHLORIDE 5 MG/1
5 TABLET ORAL EVERY 6 HOURS PRN
Status: DISCONTINUED | OUTPATIENT
Start: 2018-12-28 | End: 2018-12-29 | Stop reason: HOSPADM

## 2018-12-28 RX ORDER — KETOROLAC TROMETHAMINE 10 MG/1
10 TABLET, FILM COATED ORAL EVERY 6 HOURS PRN
Qty: 20 TABLET | Refills: 0 | Status: SHIPPED | OUTPATIENT
Start: 2018-12-28 | End: 2019-01-09

## 2018-12-28 RX ORDER — OMEPRAZOLE 40 MG/1
40 CAPSULE, DELAYED RELEASE ORAL DAILY
Qty: 30 CAPSULE | Refills: 3 | Status: SHIPPED | OUTPATIENT
Start: 2018-12-28 | End: 2019-03-19

## 2018-12-28 RX ORDER — HYOSCYAMINE SULFATE 0.125 MG
125 TABLET,DISINTEGRATING ORAL EVERY 4 HOURS PRN
Qty: 20 TABLET | Refills: 0 | Status: SHIPPED | OUTPATIENT
Start: 2018-12-28 | End: 2019-01-03

## 2018-12-28 RX ORDER — DOCUSATE SODIUM 100 MG/1
100 CAPSULE, LIQUID FILLED ORAL 2 TIMES DAILY
Status: DISCONTINUED | OUTPATIENT
Start: 2018-12-28 | End: 2018-12-29 | Stop reason: HOSPADM

## 2018-12-28 RX ORDER — POLYETHYLENE GLYCOL 3350 17 G/17G
17 POWDER, FOR SOLUTION ORAL DAILY PRN
Qty: 510 G | Refills: 1 | Status: SHIPPED | OUTPATIENT
Start: 2018-12-28 | End: 2019-01-09

## 2018-12-28 RX ORDER — ONDANSETRON 4 MG/1
4 TABLET, FILM COATED ORAL EVERY 6 HOURS PRN
Qty: 20 TABLET | Refills: 0 | Status: SHIPPED | OUTPATIENT
Start: 2018-12-28 | End: 2019-01-03

## 2018-12-28 RX ORDER — PSEUDOEPHEDRINE HCL 30 MG
100 TABLET ORAL 2 TIMES DAILY PRN
Qty: 30 CAPSULE | Refills: 1 | Status: SHIPPED | OUTPATIENT
Start: 2018-12-28 | End: 2019-01-09

## 2018-12-28 RX ADMIN — SODIUM CHLORIDE: 9 INJECTION, SOLUTION INTRAVENOUS at 04:26

## 2018-12-28 RX ADMIN — DIATRIZOATE MEGLUMINE AND DIATRIZOATE SODIUM 30 ML: 660; 100 LIQUID ORAL; RECTAL at 08:26

## 2018-12-28 RX ADMIN — DOCUSATE SODIUM 100 MG: 100 CAPSULE, LIQUID FILLED ORAL at 21:23

## 2018-12-28 RX ADMIN — ONDANSETRON 4 MG: 2 INJECTION INTRAMUSCULAR; INTRAVENOUS at 06:03

## 2018-12-28 RX ADMIN — OXYCODONE HYDROCHLORIDE 5 MG: 5 TABLET ORAL at 15:39

## 2018-12-28 RX ADMIN — OXYCODONE HYDROCHLORIDE 5 MG: 5 TABLET ORAL at 22:50

## 2018-12-28 RX ADMIN — ENOXAPARIN SODIUM 40 MG: 40 INJECTION SUBCUTANEOUS at 09:05

## 2018-12-28 RX ADMIN — DOCUSATE SODIUM 100 MG: 100 CAPSULE, LIQUID FILLED ORAL at 15:39

## 2018-12-28 RX ADMIN — BARIUM SULFATE 90 ML: 0.6 SUSPENSION ORAL at 08:25

## 2018-12-28 RX ADMIN — HYOSCYAMINE SULFATE 125 MCG: 0.12 TABLET, ORALLY DISINTEGRATING ORAL at 03:04

## 2018-12-28 RX ADMIN — KETOROLAC TROMETHAMINE 30 MG: 30 INJECTION, SOLUTION INTRAMUSCULAR at 12:17

## 2018-12-28 RX ADMIN — PANTOPRAZOLE SODIUM 40 MG: 40 INJECTION, POWDER, FOR SOLUTION INTRAVENOUS at 09:05

## 2018-12-28 RX ADMIN — ONDANSETRON 4 MG: 2 INJECTION INTRAMUSCULAR; INTRAVENOUS at 21:25

## 2018-12-28 RX ADMIN — Medication 10 ML: at 09:05

## 2018-12-28 RX ADMIN — MORPHINE SULFATE 2 MG: 2 INJECTION, SOLUTION INTRAMUSCULAR; INTRAVENOUS at 06:03

## 2018-12-28 RX ADMIN — KETOROLAC TROMETHAMINE 30 MG: 30 INJECTION, SOLUTION INTRAMUSCULAR at 03:01

## 2018-12-28 RX ADMIN — ENOXAPARIN SODIUM 40 MG: 40 INJECTION SUBCUTANEOUS at 21:23

## 2018-12-28 ASSESSMENT — PAIN SCALES - GENERAL
PAINLEVEL_OUTOF10: 5
PAINLEVEL_OUTOF10: 7
PAINLEVEL_OUTOF10: 4
PAINLEVEL_OUTOF10: 7
PAINLEVEL_OUTOF10: 4
PAINLEVEL_OUTOF10: 6
PAINLEVEL_OUTOF10: 8

## 2018-12-28 ASSESSMENT — PAIN DESCRIPTION - PAIN TYPE: TYPE: ACUTE PAIN

## 2018-12-28 ASSESSMENT — PAIN DESCRIPTION - LOCATION: LOCATION: ABDOMEN

## 2018-12-28 ASSESSMENT — PAIN DESCRIPTION - DESCRIPTORS: DESCRIPTORS: DISCOMFORT

## 2018-12-28 ASSESSMENT — PAIN DESCRIPTION - ORIENTATION: ORIENTATION: MID;UPPER

## 2018-12-29 VITALS
HEIGHT: 72 IN | WEIGHT: 315 LBS | OXYGEN SATURATION: 98 % | BODY MASS INDEX: 42.66 KG/M2 | RESPIRATION RATE: 16 BRPM | SYSTOLIC BLOOD PRESSURE: 129 MMHG | HEART RATE: 97 BPM | TEMPERATURE: 96.9 F | DIASTOLIC BLOOD PRESSURE: 80 MMHG

## 2018-12-29 PROCEDURE — APPSS30 APP SPLIT SHARED TIME 16-30 MINUTES: Performed by: NURSE PRACTITIONER

## 2018-12-29 PROCEDURE — 2580000003 HC RX 258: Performed by: SURGERY

## 2018-12-29 PROCEDURE — 6370000000 HC RX 637 (ALT 250 FOR IP): Performed by: NURSE PRACTITIONER

## 2018-12-29 PROCEDURE — 99024 POSTOP FOLLOW-UP VISIT: CPT | Performed by: NURSE PRACTITIONER

## 2018-12-29 PROCEDURE — 6360000002 HC RX W HCPCS: Performed by: SURGERY

## 2018-12-29 PROCEDURE — C9113 INJ PANTOPRAZOLE SODIUM, VIA: HCPCS | Performed by: SURGERY

## 2018-12-29 RX ORDER — OXYCODONE HYDROCHLORIDE 5 MG/1
5 TABLET ORAL EVERY 6 HOURS PRN
Qty: 20 TABLET | Refills: 0 | Status: SHIPPED | OUTPATIENT
Start: 2018-12-29 | End: 2019-01-01

## 2018-12-29 RX ORDER — OXYCODONE HYDROCHLORIDE 5 MG/1
5 TABLET ORAL EVERY 6 HOURS PRN
Qty: 20 TABLET | Refills: 0 | Status: SHIPPED | OUTPATIENT
Start: 2018-12-29 | End: 2018-12-29

## 2018-12-29 RX ADMIN — KETOROLAC TROMETHAMINE 30 MG: 30 INJECTION, SOLUTION INTRAMUSCULAR at 09:19

## 2018-12-29 RX ADMIN — Medication 10 ML: at 09:19

## 2018-12-29 RX ADMIN — DOCUSATE SODIUM 100 MG: 100 CAPSULE, LIQUID FILLED ORAL at 09:19

## 2018-12-29 RX ADMIN — ONDANSETRON 4 MG: 2 INJECTION INTRAMUSCULAR; INTRAVENOUS at 03:16

## 2018-12-29 RX ADMIN — ENOXAPARIN SODIUM 40 MG: 40 INJECTION SUBCUTANEOUS at 09:19

## 2018-12-29 RX ADMIN — PANTOPRAZOLE SODIUM 40 MG: 40 INJECTION, POWDER, FOR SOLUTION INTRAVENOUS at 09:19

## 2018-12-29 RX ADMIN — ONDANSETRON 4 MG: 2 INJECTION INTRAMUSCULAR; INTRAVENOUS at 09:19

## 2018-12-29 RX ADMIN — KETOROLAC TROMETHAMINE 30 MG: 30 INJECTION, SOLUTION INTRAMUSCULAR at 03:17

## 2018-12-29 ASSESSMENT — PAIN DESCRIPTION - FREQUENCY: FREQUENCY: CONTINUOUS

## 2018-12-29 ASSESSMENT — PAIN DESCRIPTION - PAIN TYPE: TYPE: ACUTE PAIN;SURGICAL PAIN

## 2018-12-29 ASSESSMENT — PAIN SCALES - GENERAL
PAINLEVEL_OUTOF10: 3
PAINLEVEL_OUTOF10: 3
PAINLEVEL_OUTOF10: 5
PAINLEVEL_OUTOF10: 3

## 2018-12-29 ASSESSMENT — PAIN DESCRIPTION - DESCRIPTORS: DESCRIPTORS: ACHING

## 2018-12-29 ASSESSMENT — PAIN DESCRIPTION - PROGRESSION: CLINICAL_PROGRESSION: NOT CHANGED

## 2018-12-29 ASSESSMENT — PAIN DESCRIPTION - ONSET: ONSET: ON-GOING

## 2018-12-29 ASSESSMENT — PAIN DESCRIPTION - ORIENTATION: ORIENTATION: MID

## 2018-12-29 ASSESSMENT — PAIN DESCRIPTION - LOCATION: LOCATION: ABDOMEN

## 2018-12-29 NOTE — PROGRESS NOTES
PAT call attempted patient unavailable left message with instructions    NPO after midnight  Bring insurance info and drivers license  Wear comfortable clean clothing  Do not bring jewelry   Shower night before and morning of surgery with a liquid antibacterial soap  Bring medications in original bottles  Follow all instructions give by your physician   needed at discharge  Call [phone] for any questions
hyoscyamine  OBJECTIVE   CURRENT VITALS:  height is 6' (1.829 m) and weight is 450 lb (204.1 kg) (abnormal). His oral temperature is 98 °F (36.7 °C). His blood pressure is 123/77 and his pulse is 102. His respiration is 16 and oxygen saturation is 96%. Temperature Range (24h):Temp: 98 °F (36.7 °C) Temp  Av.6 °F (37 °C)  Min: 97.9 °F (36.6 °C)  Max: 99.6 °F (37.6 °C)  BP Range (83K): Systolic (68BJV), UQQ:637 , Min:119 , PVE:076     Diastolic (39FWR), IBF:38, Min:65, Max:80    Pulse Range (24h): Pulse  Av.4  Min: 102  Max: 105  Respiration Range (24h): Resp  Av  Min: 16  Max: 16  Current Pulse Ox (24h):  SpO2: 96 %  Pulse Ox Range (24h):  SpO2  Av.7 %  Min: 94 %  Max: 97 %  Oxygen Amount and Delivery: O2 Flow Rate (L/min): 0 L/min  Incentive Spirometry Tx:            GENERAL: alert, no distress  LUNGS: clear to ausculation, without wheezes, rales or rhonci  HEART: normal rate and regular rhythm  ABDOMEN: non-distended, soft, incisional tenderness, bowel sounds present   INCISION: healing well, no significant drainage, no significant erythema  EXTERMITY: no cyanosis, clubbing or edema  In: 1829.6 [P.O.:130; I.V.:1699.6]  Out: -     Date 18 0000 - 18   Shift 8342-7553 1155-3501 3670-1818 24 Hour Total   I  N  T  A  K  E   P.O. 100   100    I.V.  (mL/kg/hr) 828  (0.5)   828    Shift Total  (mL/kg) 928  (4.5)   928  (4.5)   O  U  T  P  U  T   Shift Total  (mL/kg)       Weight (kg) 204.1 204.1 204.1 204.1     LABS     Recent Labs      18   0613   HGB  14.5   HCT  45.3   NA  142   K  4.4   CL  105   CO2  20*   BUN  14   CREATININE  0.6   CALCIUM  8.6      RADIOLOGY     PROCEDURE: FL UGI       CLINICAL INFORMATION: Obesity, status post sleeve gastrectomy.       TECHNIQUE: A preliminary abdominal radiograph was obtained.  Following the oral administration of dilute Gastrografin and thin barium, the anatomy of the distal esophagus, stomach and duodenum were evaluated in a limited
promethazine, promethazine, metoclopramide, hyoscyamine  OBJECTIVE   CURRENT VITALS:  height is 6' (1.829 m) and weight is 450 lb (204.1 kg) (abnormal). His oral temperature is 98.5 °F (36.9 °C). His blood pressure is 125/76 and his pulse is 101. His respiration is 16 and oxygen saturation is 94%. Temperature Range (24h):Temp: 98.5 °F (36.9 °C) Temp  Av.3 °F (36.8 °C)  Min: 97.8 °F (36.6 °C)  Max: 98.9 °F (37.2 °C)  BP Range (06C): Systolic (32POT), LIE:37 , Min:57 , GND:478     Diastolic (55GMG), QPY:11, Min:25, Max:85    Pulse Range (24h): Pulse  Av.6  Min: 94  Max: 105  Respiration Range (24h): Resp  Av.9  Min: 1  Max: 20  Current Pulse Ox (24h):  SpO2: 94 %  Pulse Ox Range (24h):  SpO2  Av.7 %  Min: 86 %  Max: 100 %  Oxygen Amount and Delivery: O2 Flow Rate (L/min): 0 L/min  Incentive Spirometry Tx:            GENERAL: alert, no distress  LUNGS: clear to ausculation, without wheezes, rales or rhonci  HEART: normal rate and regular rhythm  ABDOMEN: non-distended, soft, incisional tenderness, bowel sounds present   INCISION: healing well, no significant drainage, no significant erythema  EXTERMITY: no cyanosis, clubbing or edema  In: 3060.1 [I.V.:3060.1]  Out: 5     Date 18 0000 - 18 2359   Shift [id number] 9292-9136 6616-4242 24 Hour Total   I  N  T  A  K  E   I.V.  (mL/kg/hr) 949.5  (0.6)   949.5    Shift Total  (mL/kg) 949.5  (4.7)   949.5  (4.7)   O  U  T  P  U  T   Shift Total  (mL/kg)       Weight (kg) 204.1 204.1 204.1 204.1     LABS     Recent Labs      18   0613   HGB  14.5   HCT  45.3      RADIOLOGY     PROCEDURE: FL UGI       CLINICAL INFORMATION: Obesity, status post sleeve gastrectomy.       TECHNIQUE: A preliminary abdominal radiograph was obtained. Following the oral administration of dilute Gastrografin and thin barium, the anatomy of the distal esophagus, stomach and duodenum were evaluated in a limited upper GI examination. 10 images    were obtained.  Total

## 2018-12-29 NOTE — PLAN OF CARE
Problem: Pain:  Goal: Pain level will decrease  Pain level will decrease   Outcome: Ongoing  Patient complains of pain in the abdomen with a rating of 4-5 on 0-10 scale. Pain medications given to help achieve patient's stated pain goal of 4. Problem: Cardiovascular  Goal: No DVT, peripheral vascular complications  Outcome: Ongoing  Patient has had no s/sx of DVT during this shift. SCD used and lovenox given. Problem: GI  Goal: No bowel complications  Outcome: Ongoing  Bowel sounds hypoactive x 4. Patient is not passing gas. No BM during this shift. Problem: Skin Integrity/Risk  Goal: No skin breakdown during hospitalization  Outcome: Ongoing  No new skin issues noted during this shift. Surgical stab sites noted on the abdomen. Dressings are dry and intact. Problem: Infection:  Goal: Will remain free from infection  Will remain free from infection   Outcome: Ongoing  Patient has had no s/ sx of infection during this shift. Problem: Daily Care:  Goal: Daily care needs are met  Daily care needs are met   Outcome: Ongoing  Patient is able to complete daily care needs independently. Problem: Discharge Planning:  Goal: Patients continuum of care needs are met  Patients continuum of care needs are met   Outcome: Ongoing  Patient is planning to return home at discharge. Comments: Care plan reviewed with patient. Patient verbalize understanding of the plan of care and contribute to goal setting.

## 2018-12-31 ENCOUNTER — TELEPHONE (OUTPATIENT)
Dept: FAMILY MEDICINE CLINIC | Age: 38
End: 2018-12-31

## 2019-01-03 ENCOUNTER — OFFICE VISIT (OUTPATIENT)
Dept: BARIATRICS/WEIGHT MGMT | Age: 39
End: 2019-01-03

## 2019-01-03 VITALS
HEART RATE: 87 BPM | WEIGHT: 315 LBS | DIASTOLIC BLOOD PRESSURE: 70 MMHG | TEMPERATURE: 97.9 F | SYSTOLIC BLOOD PRESSURE: 140 MMHG | BODY MASS INDEX: 42.66 KG/M2 | RESPIRATION RATE: 16 BRPM | HEIGHT: 72 IN

## 2019-01-03 DIAGNOSIS — Z98.84 S/P LAPAROSCOPIC SLEEVE GASTRECTOMY: Primary | ICD-10-CM

## 2019-01-03 DIAGNOSIS — Z99.89 OSA ON CPAP: ICD-10-CM

## 2019-01-03 DIAGNOSIS — E66.01 MORBID OBESITY WITH BMI OF 50.0-59.9, ADULT (HCC): ICD-10-CM

## 2019-01-03 DIAGNOSIS — G47.33 OSA ON CPAP: ICD-10-CM

## 2019-01-03 PROCEDURE — 99024 POSTOP FOLLOW-UP VISIT: CPT | Performed by: PHYSICIAN ASSISTANT

## 2019-01-09 ENCOUNTER — OFFICE VISIT (OUTPATIENT)
Dept: BARIATRICS/WEIGHT MGMT | Age: 39
End: 2019-01-09

## 2019-01-09 ENCOUNTER — OFFICE VISIT (OUTPATIENT)
Dept: PULMONOLOGY | Age: 39
End: 2019-01-09
Payer: COMMERCIAL

## 2019-01-09 VITALS
HEIGHT: 72 IN | HEART RATE: 111 BPM | DIASTOLIC BLOOD PRESSURE: 78 MMHG | SYSTOLIC BLOOD PRESSURE: 122 MMHG | BODY MASS INDEX: 42.66 KG/M2 | OXYGEN SATURATION: 95 % | WEIGHT: 315 LBS

## 2019-01-09 VITALS
TEMPERATURE: 98.2 F | WEIGHT: 315 LBS | BODY MASS INDEX: 42.66 KG/M2 | RESPIRATION RATE: 18 BRPM | SYSTOLIC BLOOD PRESSURE: 128 MMHG | HEART RATE: 88 BPM | HEIGHT: 72 IN | DIASTOLIC BLOOD PRESSURE: 82 MMHG

## 2019-01-09 DIAGNOSIS — E66.01 MORBID OBESITY WITH BMI OF 50.0-59.9, ADULT (HCC): ICD-10-CM

## 2019-01-09 DIAGNOSIS — G47.33 OSA ON CPAP: ICD-10-CM

## 2019-01-09 DIAGNOSIS — Z98.84 S/P LAPAROSCOPIC SLEEVE GASTRECTOMY: Primary | ICD-10-CM

## 2019-01-09 DIAGNOSIS — K91.2 POSTSURGICAL MALABSORPTION: ICD-10-CM

## 2019-01-09 DIAGNOSIS — G47.33 OSA TREATED WITH BIPAP: Primary | ICD-10-CM

## 2019-01-09 DIAGNOSIS — Z99.89 OSA ON CPAP: ICD-10-CM

## 2019-01-09 PROCEDURE — 99024 POSTOP FOLLOW-UP VISIT: CPT | Performed by: PHYSICIAN ASSISTANT

## 2019-01-09 PROCEDURE — 99213 OFFICE O/P EST LOW 20 MIN: CPT | Performed by: INTERNAL MEDICINE

## 2019-01-31 ENCOUNTER — HOSPITAL ENCOUNTER (OUTPATIENT)
Age: 39
Discharge: HOME | End: 2019-01-31
Payer: COMMERCIAL

## 2019-01-31 DIAGNOSIS — Z98.84 S/P LAPAROSCOPIC SLEEVE GASTRECTOMY: ICD-10-CM

## 2019-01-31 DIAGNOSIS — E66.01 MORBID OBESITY WITH BMI OF 50.0-59.9, ADULT (HCC): ICD-10-CM

## 2019-01-31 DIAGNOSIS — K91.2 POSTSURGICAL MALABSORPTION: ICD-10-CM

## 2019-01-31 LAB
ALBUMIN SERPL-MCNC: 3.8 G/DL (ref 3.5–5.1)
ALP BLD-CCNC: 82 U/L (ref 38–126)
ALT SERPL-CCNC: 54 U/L (ref 11–66)
ANION GAP SERPL CALCULATED.3IONS-SCNC: 16 MEQ/L (ref 8–16)
AST SERPL-CCNC: 25 U/L (ref 5–40)
BASOPHILS # BLD: 0.7 %
BASOPHILS ABSOLUTE: 0 THOU/MM3 (ref 0–0.1)
BILIRUB SERPL-MCNC: 0.7 MG/DL (ref 0.3–1.2)
BUN BLDV-MCNC: 14 MG/DL (ref 7–22)
CALCIUM SERPL-MCNC: 9.1 MG/DL (ref 8.5–10.5)
CHLORIDE BLD-SCNC: 102 MEQ/L (ref 98–111)
CO2: 24 MEQ/L (ref 23–33)
CREAT SERPL-MCNC: 0.7 MG/DL (ref 0.4–1.2)
EOSINOPHIL # BLD: 1.8 %
EOSINOPHILS ABSOLUTE: 0.1 THOU/MM3 (ref 0–0.4)
ERYTHROCYTE [DISTWIDTH] IN BLOOD BY AUTOMATED COUNT: 15.2 % (ref 11.5–14.5)
ERYTHROCYTE [DISTWIDTH] IN BLOOD BY AUTOMATED COUNT: 46.2 FL (ref 35–45)
GFR SERPL CREATININE-BSD FRML MDRD: > 90 ML/MIN/1.73M2
GLUCOSE BLD-MCNC: 83 MG/DL (ref 70–108)
HCT VFR BLD CALC: 47.3 % (ref 42–52)
HEMOGLOBIN: 15.4 GM/DL (ref 14–18)
IMMATURE GRANS (ABS): 0.02 THOU/MM3 (ref 0–0.07)
IMMATURE GRANULOCYTES: 0.4 %
LYMPHOCYTES # BLD: 29.2 %
LYMPHOCYTES ABSOLUTE: 1.7 THOU/MM3 (ref 1–4.8)
MCH RBC QN AUTO: 27.9 PG (ref 26–33)
MCHC RBC AUTO-ENTMCNC: 32.6 GM/DL (ref 32.2–35.5)
MCV RBC AUTO: 85.7 FL (ref 80–94)
MONOCYTES # BLD: 9.3 %
MONOCYTES ABSOLUTE: 0.5 THOU/MM3 (ref 0.4–1.3)
NUCLEATED RED BLOOD CELLS: 0 /100 WBC
PLATELET # BLD: 158 THOU/MM3 (ref 130–400)
PMV BLD AUTO: 12.7 FL (ref 9.4–12.4)
POTASSIUM SERPL-SCNC: 4.9 MEQ/L (ref 3.5–5.2)
PREALBUMIN: 15.6 MG/DL (ref 20–40)
RBC # BLD: 5.52 MILL/MM3 (ref 4.7–6.1)
SEG NEUTROPHILS: 58.6 %
SEGMENTED NEUTROPHILS ABSOLUTE COUNT: 3.3 THOU/MM3 (ref 1.8–7.7)
SODIUM BLD-SCNC: 142 MEQ/L (ref 135–145)
TOTAL PROTEIN: 6.9 G/DL (ref 6.1–8)
WBC # BLD: 5.7 THOU/MM3 (ref 4.8–10.8)

## 2019-01-31 PROCEDURE — 36415 COLL VENOUS BLD VENIPUNCTURE: CPT

## 2019-01-31 PROCEDURE — 84134 ASSAY OF PREALBUMIN: CPT

## 2019-01-31 PROCEDURE — 85025 COMPLETE CBC W/AUTO DIFF WBC: CPT

## 2019-01-31 PROCEDURE — 80053 COMPREHEN METABOLIC PANEL: CPT

## 2019-02-05 ENCOUNTER — OFFICE VISIT (OUTPATIENT)
Dept: BARIATRICS/WEIGHT MGMT | Age: 39
End: 2019-02-05

## 2019-02-05 VITALS
HEIGHT: 72 IN | HEART RATE: 66 BPM | WEIGHT: 315 LBS | DIASTOLIC BLOOD PRESSURE: 72 MMHG | TEMPERATURE: 97.9 F | BODY MASS INDEX: 42.66 KG/M2 | RESPIRATION RATE: 18 BRPM | SYSTOLIC BLOOD PRESSURE: 124 MMHG

## 2019-02-05 DIAGNOSIS — K91.2 POSTSURGICAL MALABSORPTION: ICD-10-CM

## 2019-02-05 DIAGNOSIS — Z98.84 S/P LAPAROSCOPIC SLEEVE GASTRECTOMY: Primary | ICD-10-CM

## 2019-02-05 DIAGNOSIS — E66.01 MORBID OBESITY WITH BMI OF 50.0-59.9, ADULT (HCC): ICD-10-CM

## 2019-02-05 DIAGNOSIS — Z99.89 OSA ON CPAP: ICD-10-CM

## 2019-02-05 DIAGNOSIS — G47.33 OSA ON CPAP: ICD-10-CM

## 2019-02-05 PROCEDURE — 99024 POSTOP FOLLOW-UP VISIT: CPT | Performed by: PHYSICIAN ASSISTANT

## 2019-03-04 ENCOUNTER — TELEPHONE (OUTPATIENT)
Dept: BARIATRICS/WEIGHT MGMT | Age: 39
End: 2019-03-04

## 2019-03-16 ENCOUNTER — HOSPITAL ENCOUNTER (OUTPATIENT)
Age: 39
Discharge: HOME | End: 2019-03-16
Payer: COMMERCIAL

## 2019-03-16 DIAGNOSIS — K91.2 POSTSURGICAL MALABSORPTION: ICD-10-CM

## 2019-03-16 DIAGNOSIS — E66.01 MORBID OBESITY WITH BMI OF 50.0-59.9, ADULT (HCC): ICD-10-CM

## 2019-03-16 DIAGNOSIS — Z98.84 S/P LAPAROSCOPIC SLEEVE GASTRECTOMY: ICD-10-CM

## 2019-03-16 LAB
ALBUMIN SERPL-MCNC: 3.9 G/DL (ref 3.5–5.1)
ALP BLD-CCNC: 85 U/L (ref 38–126)
ALT SERPL-CCNC: 31 U/L (ref 11–66)
ANION GAP SERPL CALCULATED.3IONS-SCNC: 12 MEQ/L (ref 8–16)
AST SERPL-CCNC: 17 U/L (ref 5–40)
BASOPHILS # BLD: 0.6 %
BASOPHILS ABSOLUTE: 0 THOU/MM3 (ref 0–0.1)
BILIRUB SERPL-MCNC: 0.6 MG/DL (ref 0.3–1.2)
BUN BLDV-MCNC: 15 MG/DL (ref 7–22)
CALCIUM SERPL-MCNC: 9.5 MG/DL (ref 8.5–10.5)
CHLORIDE BLD-SCNC: 103 MEQ/L (ref 98–111)
CO2: 26 MEQ/L (ref 23–33)
CREAT SERPL-MCNC: 0.5 MG/DL (ref 0.4–1.2)
EOSINOPHIL # BLD: 1 %
EOSINOPHILS ABSOLUTE: 0.1 THOU/MM3 (ref 0–0.4)
ERYTHROCYTE [DISTWIDTH] IN BLOOD BY AUTOMATED COUNT: 16.2 % (ref 11.5–14.5)
ERYTHROCYTE [DISTWIDTH] IN BLOOD BY AUTOMATED COUNT: 48.1 FL (ref 35–45)
GFR SERPL CREATININE-BSD FRML MDRD: > 90 ML/MIN/1.73M2
GLUCOSE BLD-MCNC: 95 MG/DL (ref 70–108)
HCT VFR BLD CALC: 46.3 % (ref 42–52)
HEMOGLOBIN: 15.3 GM/DL (ref 14–18)
IMMATURE GRANS (ABS): 0.02 THOU/MM3 (ref 0–0.07)
IMMATURE GRANULOCYTES: 0.3 %
LYMPHOCYTES # BLD: 23.3 %
LYMPHOCYTES ABSOLUTE: 1.6 THOU/MM3 (ref 1–4.8)
MCH RBC QN AUTO: 28 PG (ref 26–33)
MCHC RBC AUTO-ENTMCNC: 33 GM/DL (ref 32.2–35.5)
MCV RBC AUTO: 84.6 FL (ref 80–94)
MONOCYTES # BLD: 8.7 %
MONOCYTES ABSOLUTE: 0.6 THOU/MM3 (ref 0.4–1.3)
NUCLEATED RED BLOOD CELLS: 0 /100 WBC
PLATELET # BLD: 196 THOU/MM3 (ref 130–400)
PMV BLD AUTO: 12.5 FL (ref 9.4–12.4)
POTASSIUM SERPL-SCNC: 4.2 MEQ/L (ref 3.5–5.2)
PREALBUMIN: 16 MG/DL (ref 20–40)
RBC # BLD: 5.47 MILL/MM3 (ref 4.7–6.1)
SEG NEUTROPHILS: 66.1 %
SEGMENTED NEUTROPHILS ABSOLUTE COUNT: 4.5 THOU/MM3 (ref 1.8–7.7)
SODIUM BLD-SCNC: 141 MEQ/L (ref 135–145)
TOTAL PROTEIN: 6.9 G/DL (ref 6.1–8)
VITAMIN D 25-HYDROXY: 35 NG/ML (ref 30–100)
WBC # BLD: 6.8 THOU/MM3 (ref 4.8–10.8)

## 2019-03-16 PROCEDURE — 84425 ASSAY OF VITAMIN B-1: CPT

## 2019-03-16 PROCEDURE — 84134 ASSAY OF PREALBUMIN: CPT

## 2019-03-16 PROCEDURE — 82306 VITAMIN D 25 HYDROXY: CPT

## 2019-03-16 PROCEDURE — 85025 COMPLETE CBC W/AUTO DIFF WBC: CPT

## 2019-03-16 PROCEDURE — 36415 COLL VENOUS BLD VENIPUNCTURE: CPT

## 2019-03-16 PROCEDURE — 80053 COMPREHEN METABOLIC PANEL: CPT

## 2019-03-19 ENCOUNTER — OFFICE VISIT (OUTPATIENT)
Dept: BARIATRICS/WEIGHT MGMT | Age: 39
End: 2019-03-19

## 2019-03-19 VITALS
TEMPERATURE: 97.6 F | DIASTOLIC BLOOD PRESSURE: 72 MMHG | HEIGHT: 72 IN | SYSTOLIC BLOOD PRESSURE: 128 MMHG | WEIGHT: 315 LBS | HEART RATE: 76 BPM | RESPIRATION RATE: 18 BRPM | BODY MASS INDEX: 42.66 KG/M2

## 2019-03-19 DIAGNOSIS — E66.01 MORBID OBESITY WITH BMI OF 50.0-59.9, ADULT (HCC): ICD-10-CM

## 2019-03-19 DIAGNOSIS — Z98.84 S/P LAPAROSCOPIC SLEEVE GASTRECTOMY: Primary | ICD-10-CM

## 2019-03-19 DIAGNOSIS — Z13.21 MALNUTRITION SCREEN: ICD-10-CM

## 2019-03-19 DIAGNOSIS — K91.2 POSTSURGICAL MALABSORPTION: ICD-10-CM

## 2019-03-19 PROCEDURE — 99024 POSTOP FOLLOW-UP VISIT: CPT | Performed by: PHYSICIAN ASSISTANT

## 2019-03-20 LAB — VITAMIN B1 WHOLE BLOOD: 107 NMOL/L (ref 70–180)

## 2019-06-15 ENCOUNTER — HOSPITAL ENCOUNTER (OUTPATIENT)
Age: 39
Discharge: HOME | End: 2019-06-15
Payer: COMMERCIAL

## 2019-06-15 DIAGNOSIS — E66.01 MORBID OBESITY WITH BMI OF 50.0-59.9, ADULT (HCC): ICD-10-CM

## 2019-06-15 DIAGNOSIS — K91.2 POSTSURGICAL MALABSORPTION: ICD-10-CM

## 2019-06-15 DIAGNOSIS — Z98.84 S/P LAPAROSCOPIC SLEEVE GASTRECTOMY: ICD-10-CM

## 2019-06-15 DIAGNOSIS — Z13.21 MALNUTRITION SCREEN: ICD-10-CM

## 2019-06-15 LAB
ALBUMIN SERPL-MCNC: 4.2 G/DL (ref 3.5–5.1)
ALP BLD-CCNC: 74 U/L (ref 38–126)
ALT SERPL-CCNC: 14 U/L (ref 11–66)
ANION GAP SERPL CALCULATED.3IONS-SCNC: 14 MEQ/L (ref 8–16)
AST SERPL-CCNC: 13 U/L (ref 5–40)
AVERAGE GLUCOSE: 90 MG/DL (ref 70–126)
BASOPHILS # BLD: 0.8 %
BASOPHILS ABSOLUTE: 0 THOU/MM3 (ref 0–0.1)
BILIRUB SERPL-MCNC: 0.8 MG/DL (ref 0.3–1.2)
BUN BLDV-MCNC: 17 MG/DL (ref 7–22)
CALCIUM SERPL-MCNC: 9.5 MG/DL (ref 8.5–10.5)
CHLORIDE BLD-SCNC: 105 MEQ/L (ref 98–111)
CO2: 25 MEQ/L (ref 23–33)
CREAT SERPL-MCNC: 0.5 MG/DL (ref 0.4–1.2)
EOSINOPHIL # BLD: 1.8 %
EOSINOPHILS ABSOLUTE: 0.1 THOU/MM3 (ref 0–0.4)
ERYTHROCYTE [DISTWIDTH] IN BLOOD BY AUTOMATED COUNT: 15.1 % (ref 11.5–14.5)
ERYTHROCYTE [DISTWIDTH] IN BLOOD BY AUTOMATED COUNT: 46.6 FL (ref 35–45)
FERRITIN: 91 NG/ML (ref 22–322)
GFR SERPL CREATININE-BSD FRML MDRD: > 90 ML/MIN/1.73M2
GLUCOSE BLD-MCNC: 92 MG/DL (ref 70–108)
HBA1C MFR BLD: 5 % (ref 4.4–6.4)
HCT VFR BLD CALC: 46.5 % (ref 42–52)
HEMOGLOBIN: 15.8 GM/DL (ref 14–18)
IMMATURE GRANS (ABS): 0.01 THOU/MM3 (ref 0–0.07)
IMMATURE GRANULOCYTES: 0.2 %
IRON: 61 UG/DL (ref 65–195)
LYMPHOCYTES # BLD: 25.4 %
LYMPHOCYTES ABSOLUTE: 1.5 THOU/MM3 (ref 1–4.8)
MCH RBC QN AUTO: 29.2 PG (ref 26–33)
MCHC RBC AUTO-ENTMCNC: 34 GM/DL (ref 32.2–35.5)
MCV RBC AUTO: 86 FL (ref 80–94)
MONOCYTES # BLD: 9.9 %
MONOCYTES ABSOLUTE: 0.6 THOU/MM3 (ref 0.4–1.3)
NUCLEATED RED BLOOD CELLS: 0 /100 WBC
PLATELET # BLD: 197 THOU/MM3 (ref 130–400)
PMV BLD AUTO: 12.1 FL (ref 9.4–12.4)
POTASSIUM SERPL-SCNC: 4 MEQ/L (ref 3.5–5.2)
PREALBUMIN: 15.2 MG/DL (ref 20–40)
PTH INTACT: 46.9 PG/ML (ref 15–65)
RBC # BLD: 5.41 MILL/MM3 (ref 4.7–6.1)
SEG NEUTROPHILS: 61.9 %
SEGMENTED NEUTROPHILS ABSOLUTE COUNT: 3.8 THOU/MM3 (ref 1.8–7.7)
SODIUM BLD-SCNC: 144 MEQ/L (ref 135–145)
TOTAL IRON BINDING CAPACITY: 243 UG/DL (ref 171–450)
TOTAL PROTEIN: 7.3 G/DL (ref 6.1–8)
TSH SERPL DL<=0.05 MIU/L-ACNC: 1.22 UIU/ML (ref 0.4–4.2)
VITAMIN D 25-HYDROXY: 31 NG/ML (ref 30–100)
WBC # BLD: 6.1 THOU/MM3 (ref 4.8–10.8)

## 2019-06-15 PROCEDURE — 80053 COMPREHEN METABOLIC PANEL: CPT

## 2019-06-15 PROCEDURE — 83540 ASSAY OF IRON: CPT

## 2019-06-15 PROCEDURE — 36415 COLL VENOUS BLD VENIPUNCTURE: CPT

## 2019-06-15 PROCEDURE — 85025 COMPLETE CBC W/AUTO DIFF WBC: CPT

## 2019-06-15 PROCEDURE — 82306 VITAMIN D 25 HYDROXY: CPT

## 2019-06-15 PROCEDURE — 83970 ASSAY OF PARATHORMONE: CPT

## 2019-06-15 PROCEDURE — 83550 IRON BINDING TEST: CPT

## 2019-06-15 PROCEDURE — 84134 ASSAY OF PREALBUMIN: CPT

## 2019-06-15 PROCEDURE — 82728 ASSAY OF FERRITIN: CPT

## 2019-06-15 PROCEDURE — 84443 ASSAY THYROID STIM HORMONE: CPT

## 2019-06-15 PROCEDURE — 84425 ASSAY OF VITAMIN B-1: CPT

## 2019-06-15 PROCEDURE — 83036 HEMOGLOBIN GLYCOSYLATED A1C: CPT

## 2019-06-19 ENCOUNTER — OFFICE VISIT (OUTPATIENT)
Dept: BARIATRICS/WEIGHT MGMT | Age: 39
End: 2019-06-19
Payer: COMMERCIAL

## 2019-06-19 VITALS
HEART RATE: 76 BPM | SYSTOLIC BLOOD PRESSURE: 106 MMHG | HEIGHT: 72 IN | BODY MASS INDEX: 42.66 KG/M2 | WEIGHT: 315 LBS | DIASTOLIC BLOOD PRESSURE: 64 MMHG | TEMPERATURE: 97.5 F

## 2019-06-19 DIAGNOSIS — K91.2 POSTSURGICAL MALABSORPTION: ICD-10-CM

## 2019-06-19 DIAGNOSIS — E66.01 MORBID OBESITY WITH BMI OF 45.0-49.9, ADULT (HCC): ICD-10-CM

## 2019-06-19 DIAGNOSIS — E61.1 LOW SERUM IRON: ICD-10-CM

## 2019-06-19 DIAGNOSIS — Z98.84 S/P LAPAROSCOPIC SLEEVE GASTRECTOMY: Primary | ICD-10-CM

## 2019-06-19 PROCEDURE — 99213 OFFICE O/P EST LOW 20 MIN: CPT | Performed by: PHYSICIAN ASSISTANT

## 2019-06-19 NOTE — PROGRESS NOTES
status:      Spouse name: Not on file    Number of children: Not on file    Years of education: Not on file    Highest education level: Not on file   Occupational History    Not on file   Social Needs    Financial resource strain: Not on file    Food insecurity:     Worry: Not on file     Inability: Not on file    Transportation needs:     Medical: Not on file     Non-medical: Not on file   Tobacco Use    Smoking status: Never Smoker    Smokeless tobacco: Never Used   Substance and Sexual Activity    Alcohol use: Yes     Alcohol/week: 3.6 oz     Types: 6 Cans of beer per week     Comment: every couple of months    Drug use: No    Sexual activity: Yes     Partners: Female   Lifestyle    Physical activity:     Days per week: Not on file     Minutes per session: Not on file    Stress: Not on file   Relationships    Social connections:     Talks on phone: Not on file     Gets together: Not on file     Attends Confucianist service: Not on file     Active member of club or organization: Not on file     Attends meetings of clubs or organizations: Not on file     Relationship status: Not on file    Intimate partner violence:     Fear of current or ex partner: Not on file     Emotionally abused: Not on file     Physically abused: Not on file     Forced sexual activity: Not on file   Other Topics Concern    Not on file   Social History Narrative    Not on file        Medications:   Current Outpatient Medications   Medication Sig Dispense Refill    Calcium Citrate-Vitamin D (CALCIUM + VIT D, BARIATRIC ADVANTAGE, CHEWABLE TABLET) Take 1 tablet by mouth 3 times daily      Multiple Vitamin (MVI, BARIATRIC ADVANTAGE MULTI-FORMULA, CHEW TAB) Take 1 tablet by mouth 2 times daily      Cyanocobalamin (B-12 SL) Place 1 tablet under the tongue daily       No current facility-administered medications for this visit.         Allergies:   No Known Allergies    Subjective:    Review of Systems:  Constitutional: Denies any fever, chills, fatigue. Wound: Denies any rash, skin color changes or wound problems. Resp: Denies any cough, shortness of breath. CV: Denies any chest pain, orthopnea or syncope. MS: Denies myalgias, arthralgias. GI: Denies any nausea, vomiting, diarrhea, constipation, melena, hematochezia. No incisional discomfort. : Denies any hematuria, hesitancy or dysuria. NEURO: Denies seizures, headache. Objective:    /64 (Site: Right Upper Arm, Position: Sitting, Cuff Size: Large Adult)   Pulse 76   Temp 97.5 °F (36.4 °C) (Oral)   Ht 5' 11.75\" (1.822 m)   Wt (!) 336 lb (152.4 kg)   BMI 45.89 kg/m²     Physical Examination:   Constitutional: Alert and oriented to person, place and time. Well-developed, well- nourished. Head: Normocephalic and atraumatic  Neck: Supple. Eyes: EOMI b/l. Conjunctivae normal.  No scleral icterus. Respiratory: Effort normal. No respiratory distress. Abd: Benign  Ext:  Movement x 4. No edema  Skin; Warm and dry, no visible rashes, lesions or ulcers.    Neuro: Cranial Nerves Grossly Intact; nml coordination        Labs:  CBC   Lab Results   Component Value Date    WBC 6.1 06/15/2019    RBC 5.41 06/15/2019    HGB 15.8 06/15/2019    HCT 46.5 06/15/2019    MCV 86.0 06/15/2019    MCH 29.2 06/15/2019    MCHC 34.0 06/15/2019    RDW 14.6 05/05/2018     06/15/2019    MPV 12.1 06/15/2019    SEGSPCT 61.9 06/15/2019    LABLYMP 25.4 06/15/2019    MONOPCT 9.9 06/15/2019    LABEOS 1.8 06/15/2019    BASO 0.8 06/15/2019    NRBC 0 06/15/2019    ANISOCYTOSIS 1+ 05/05/2018    SEGSABS 3.8 06/15/2019    LYMPHSABS 1.5 06/15/2019    MONOSABS 0.6 06/15/2019    EOSABS 0.1 06/15/2019    BASOSABS 0.0 06/15/2019        BMP/CMP   Lab Results   Component Value Date    GLUCOSE 92 06/15/2019    CREATININE 0.5 06/15/2019    BUN 17 06/15/2019     06/15/2019    K 4.0 06/15/2019    K 4.4 12/28/2018     06/15/2019    CO2 25 06/15/2019    CALCIUM 9.5 06/15/2019    AST 13 06/15/2019    ALKPHOS 74 06/15/2019    PROT 7.3 06/15/2019    LABALBU 4.2 06/15/2019    BILITOT 0.8 06/15/2019    ALT 14 06/15/2019        PREALBUMIN   Lab Results   Component Value Date    PREALBUMIN 15.2 06/15/2019        VITAMIN B12   Lab Results   Component Value Date    YYPUAQVW93 422 05/05/2018        24 HOUR URINE CALCIUM   No results found for: Madelineaurelio Cortes CALCIUMUR     VITAMIN D   Lab Results   Component Value Date    VITD25 31 06/15/2019        VITAMIN B1/ THIAMINE   Lab Results   Component Value Date    DQFM1DHTRWN 107 03/16/2019        RBC FOLATE   Lab Results   Component Value Date    FOLATE 11.0 05/05/2018        LIPID SCREEN (FASTING)   Lab Results   Component Value Date    CHOL 142 05/05/2018    TRIG 179 05/05/2018    HDL 31 05/05/2018    LDLCALC 75 05/05/2018   ,     HGA1C (T2DM ONLY)   Lab Results   Component Value Date    LABA1C 5.0 06/15/2019    AVGG 90 06/15/2019        TSH   Lab Results   Component Value Date    TSH 1.220 06/15/2019        IRON   Lab Results   Component Value Date    IRON 61 06/15/2019        IONIZED CALCIUM   No results found for: JUVENTINO JULIO      Assessment:       Diagnosis Orders   1. S/P laparoscopic sleeve gastrectomy  CBC Auto Differential    Ferritin    Iron    Iron Binding Capacity   2. Morbid obesity with BMI of 45.0-49.9, adult (HCC)  CBC Auto Differential    Ferritin    Iron    Iron Binding Capacity   3. Low serum iron  CBC Auto Differential    Ferritin    Iron    Iron Binding Capacity   4. Postsurgical malabsorption  CBC Auto Differential    Ferritin    Iron    Iron Binding Capacity     Plan:    Stay well hydrated. Drink a minimum of 64 oz of non-carbonated, non-caffeinated fluids daily. Nutritional education occurred during visit. Tolerating diet. Continue following with dietitian and follow their recommendations as directed. Continue  60-80 grams of protein each day.     Signs and symptoms reviewed with patient that would be concerning and need her to return to office for re-evaluation. Patient will call if any questions or concerns arrise. Importance of physical activity discussed with patient. Increase physical activity as tolerated  Add strength training  Continue taking Multivitamin, Calcium and Iron  Encouraged to attend support groups  6 month labs reviewed with patient today- B1 pending  Repeat Iron studies for 9 month post op  SECA scale completed and reviewed with patient today  Measurements completed and reviewed with patient today  Return in about 3 months (around 9/19/2019) for postop follow up. I spent over 15 minutes with the patient, with greater that 50% of that time spent on face counseling for nutrition and exercise.     Electronically signed by TANNER Mcrae on 6/19/2019 at 3:41 PM

## 2019-06-20 LAB — VITAMIN B1 WHOLE BLOOD: 105 NMOL/L (ref 70–180)

## 2019-09-19 ENCOUNTER — OFFICE VISIT (OUTPATIENT)
Dept: BARIATRICS/WEIGHT MGMT | Age: 39
End: 2019-09-19
Payer: COMMERCIAL

## 2019-09-19 ENCOUNTER — OFFICE VISIT (OUTPATIENT)
Dept: FAMILY MEDICINE CLINIC | Age: 39
End: 2019-09-19
Payer: COMMERCIAL

## 2019-09-19 VITALS
DIASTOLIC BLOOD PRESSURE: 62 MMHG | TEMPERATURE: 98 F | HEART RATE: 68 BPM | HEIGHT: 72 IN | WEIGHT: 307 LBS | SYSTOLIC BLOOD PRESSURE: 102 MMHG | BODY MASS INDEX: 41.58 KG/M2

## 2019-09-19 VITALS
HEART RATE: 68 BPM | SYSTOLIC BLOOD PRESSURE: 112 MMHG | WEIGHT: 312 LBS | BODY MASS INDEX: 41.35 KG/M2 | HEIGHT: 73 IN | RESPIRATION RATE: 12 BRPM | DIASTOLIC BLOOD PRESSURE: 70 MMHG

## 2019-09-19 DIAGNOSIS — Z23 NEED FOR INFLUENZA VACCINATION: ICD-10-CM

## 2019-09-19 DIAGNOSIS — Z13.21 SCREENING FOR MALNUTRITION: ICD-10-CM

## 2019-09-19 DIAGNOSIS — Z98.84 S/P LAPAROSCOPIC SLEEVE GASTRECTOMY: Primary | ICD-10-CM

## 2019-09-19 DIAGNOSIS — K91.2 POSTSURGICAL MALABSORPTION: ICD-10-CM

## 2019-09-19 DIAGNOSIS — Z00.00 ANNUAL PHYSICAL EXAM: Primary | ICD-10-CM

## 2019-09-19 DIAGNOSIS — E66.01 MORBID OBESITY WITH BMI OF 40.0-44.9, ADULT (HCC): ICD-10-CM

## 2019-09-19 LAB
CHOLESTEROL, TOTAL: 133 MG/DL (ref 100–199)
GLUCOSE FASTING: 94 MG/DL (ref 70–108)
HDLC SERPL-MCNC: 33 MG/DL
LDL CHOLESTEROL CALCULATED: 75 MG/DL
TRIGL SERPL-MCNC: 126 MG/DL (ref 0–199)

## 2019-09-19 PROCEDURE — 99213 OFFICE O/P EST LOW 20 MIN: CPT | Performed by: PHYSICIAN ASSISTANT

## 2019-09-19 PROCEDURE — 99395 PREV VISIT EST AGE 18-39: CPT | Performed by: FAMILY MEDICINE

## 2019-09-19 PROCEDURE — 90471 IMMUNIZATION ADMIN: CPT | Performed by: FAMILY MEDICINE

## 2019-09-19 PROCEDURE — 90686 IIV4 VACC NO PRSV 0.5 ML IM: CPT | Performed by: FAMILY MEDICINE

## 2019-09-19 PROCEDURE — 36415 COLL VENOUS BLD VENIPUNCTURE: CPT | Performed by: FAMILY MEDICINE

## 2019-09-19 ASSESSMENT — ENCOUNTER SYMPTOMS
SHORTNESS OF BREATH: 0
EYES NEGATIVE: 1
ABDOMINAL PAIN: 0
CHEST TIGHTNESS: 0
BLOOD IN STOOL: 0

## 2019-09-19 ASSESSMENT — PATIENT HEALTH QUESTIONNAIRE - PHQ9
1. LITTLE INTEREST OR PLEASURE IN DOING THINGS: 0
2. FEELING DOWN, DEPRESSED OR HOPELESS: 0
SUM OF ALL RESPONSES TO PHQ9 QUESTIONS 1 & 2: 0
SUM OF ALL RESPONSES TO PHQ QUESTIONS 1-9: 0
SUM OF ALL RESPONSES TO PHQ QUESTIONS 1-9: 0

## 2019-09-19 NOTE — PROGRESS NOTES
Chief Complaint   Patient presents with    Annual Exam       SUBJECTIVE     Jacey Robertson is a 42 y.o.male    Pt presents for annual wellness physical exam.        Pt stable since last visit- no newproblems for diagnoses listed below:  Patient Active Problem List   Diagnosis    Morbid obesity ([address]    CATHLEEN on CPAP    Obesity    S/P laparoscopic sleeve gastrectomy     Doing very well. Has lost 170# since having a sleeve gastrectomy. He feels less tired and has more stamina. He is staying active and following his diet. Needs wellness labs for insurance. Takes no Rx meds. He continues on bariatric vitamins and supplements. He has regular follow up with his surgeon. No changes in family history. Nonsmoker. Body mass index is 41.5 kg/m². Review of Systems   Constitutional: Negative for chills, fatigue, fever and unexpected weight change. HENT: Negative. Eyes: Negative. Respiratory: Negative for chest tightness and shortness of breath. Cardiovascular: Negative for chest pain, palpitations and leg swelling. Gastrointestinal: Negative for abdominal pain and blood in stool. Genitourinary: Negative for dysuria. Musculoskeletal: Negative for joint swelling. Skin: Negative for rash. Neurological: Negative for dizziness. Psychiatric/Behavioral: Negative. All other systems reviewed and are negative. OBJECTIVE     /70   Pulse 68   Resp 12   Ht 6' 0.7\" (1.847 m)   Wt (!) 312 lb (141.5 kg)   BMI 41.50 kg/m²     Wt Readings from Last 3 Encounters:   09/19/19 (!) 312 lb (141.5 kg)   06/19/19 (!) 336 lb (152.4 kg)   03/19/19 (!) 382 lb 9.6 oz (173.5 kg)       Physical Exam   Constitutional: He is oriented to person, place, and time. He appears well-developed and well-nourished. HENT:   Head: Normocephalic and atraumatic.    Right Ear: Tympanic membrane normal.   Left Ear: Tympanic membrane normal.   Mouth/Throat: Oropharynx is clear and moist.   Eyes: Conjunctivae

## 2019-09-19 NOTE — PROGRESS NOTES
Blood work drawn today in the office, venous puncture by Alice Pacheco, [facility], right arm, pt tolerated well. After obtaining consent, and per orders of Dr. Haley Sosa, injection of Influenza vaccine 0.5 mL IM given by AMANDA Brush [facility].      Immunizations Administered     Name Date Dose Route    Influenza, Quadv, IM, PF (6 mo and older Fluzone, Flulaval, Fluarix, and 3 yrs and older Afluria) 9/19/2019 0.5 mL Intramuscular    Site: Deltoid- Right    Lot: [id number]    [address]: 17486-861-71

## 2019-12-14 ENCOUNTER — HOSPITAL ENCOUNTER (OUTPATIENT)
Age: 39
Discharge: HOME | End: 2019-12-14
Payer: COMMERCIAL

## 2019-12-14 DIAGNOSIS — Z13.21 SCREENING FOR MALNUTRITION: ICD-10-CM

## 2019-12-14 DIAGNOSIS — K91.2 POSTSURGICAL MALABSORPTION: ICD-10-CM

## 2019-12-14 DIAGNOSIS — Z98.84 S/P LAPAROSCOPIC SLEEVE GASTRECTOMY: ICD-10-CM

## 2019-12-14 DIAGNOSIS — E66.01 MORBID OBESITY WITH BMI OF 40.0-44.9, ADULT (HCC): ICD-10-CM

## 2019-12-14 LAB
ALBUMIN SERPL-MCNC: 4.2 G/DL (ref 3.5–5.1)
ALP BLD-CCNC: 75 U/L (ref 38–126)
ALT SERPL-CCNC: 13 U/L (ref 11–66)
ANION GAP SERPL CALCULATED.3IONS-SCNC: 9 MEQ/L (ref 8–16)
AST SERPL-CCNC: 14 U/L (ref 5–40)
AVERAGE GLUCOSE: 84 MG/DL (ref 70–126)
BASOPHILS # BLD: 0.8 %
BASOPHILS ABSOLUTE: 0 THOU/MM3 (ref 0–0.1)
BILIRUB SERPL-MCNC: 0.8 MG/DL (ref 0.3–1.2)
BUN BLDV-MCNC: 19 MG/DL (ref 7–22)
CALCIUM SERPL-MCNC: 9.5 MG/DL (ref 8.5–10.5)
CHLORIDE BLD-SCNC: 104 MEQ/L (ref 98–111)
CO2: 27 MEQ/L (ref 23–33)
CREAT SERPL-MCNC: 0.6 MG/DL (ref 0.4–1.2)
EOSINOPHIL # BLD: 2.1 %
EOSINOPHILS ABSOLUTE: 0.1 THOU/MM3 (ref 0–0.4)
ERYTHROCYTE [DISTWIDTH] IN BLOOD BY AUTOMATED COUNT: 14.4 % (ref 11.5–14.5)
ERYTHROCYTE [DISTWIDTH] IN BLOOD BY AUTOMATED COUNT: 46.3 FL (ref 35–45)
FERRITIN: 91 NG/ML (ref 22–322)
FOLATE: 16.7 NG/ML (ref 4.8–24.2)
GFR SERPL CREATININE-BSD FRML MDRD: > 90 ML/MIN/1.73M2
GLUCOSE BLD-MCNC: 92 MG/DL (ref 70–108)
HBA1C MFR BLD: 4.8 % (ref 4.4–6.4)
HCT VFR BLD CALC: 48.9 % (ref 42–52)
HEMOGLOBIN: 16.1 GM/DL (ref 14–18)
IMMATURE GRANS (ABS): 0.01 THOU/MM3 (ref 0–0.07)
IMMATURE GRANULOCYTES: 0.2 %
IRON: 142 UG/DL (ref 65–195)
LYMPHOCYTES # BLD: 30.3 %
LYMPHOCYTES ABSOLUTE: 1.6 THOU/MM3 (ref 1–4.8)
MCH RBC QN AUTO: 29.7 PG (ref 26–33)
MCHC RBC AUTO-ENTMCNC: 32.9 GM/DL (ref 32.2–35.5)
MCV RBC AUTO: 90.1 FL (ref 80–94)
MONOCYTES # BLD: 8.9 %
MONOCYTES ABSOLUTE: 0.5 THOU/MM3 (ref 0.4–1.3)
NUCLEATED RED BLOOD CELLS: 0 /100 WBC
PLATELET # BLD: 196 THOU/MM3 (ref 130–400)
PMV BLD AUTO: 11.8 FL (ref 9.4–12.4)
POTASSIUM SERPL-SCNC: 4.2 MEQ/L (ref 3.5–5.2)
PREALBUMIN: 21.9 MG/DL (ref 20–40)
PTH INTACT: 36.7 PG/ML (ref 15–65)
RBC # BLD: 5.43 MILL/MM3 (ref 4.7–6.1)
SEG NEUTROPHILS: 57.7 %
SEGMENTED NEUTROPHILS ABSOLUTE COUNT: 3 THOU/MM3 (ref 1.8–7.7)
SODIUM BLD-SCNC: 140 MEQ/L (ref 135–145)
TOTAL IRON BINDING CAPACITY: 259 UG/DL (ref 171–450)
TOTAL PROTEIN: 7 G/DL (ref 6.1–8)
TSH SERPL DL<=0.05 MIU/L-ACNC: 1.05 UIU/ML (ref 0.4–4.2)
VITAMIN B-12: > 2000 PG/ML (ref 211–911)
VITAMIN D 25-HYDROXY: 26 NG/ML (ref 30–100)
WBC # BLD: 5.2 THOU/MM3 (ref 4.8–10.8)

## 2019-12-14 PROCEDURE — 82306 VITAMIN D 25 HYDROXY: CPT

## 2019-12-14 PROCEDURE — 83036 HEMOGLOBIN GLYCOSYLATED A1C: CPT

## 2019-12-14 PROCEDURE — 84134 ASSAY OF PREALBUMIN: CPT

## 2019-12-14 PROCEDURE — 36415 COLL VENOUS BLD VENIPUNCTURE: CPT

## 2019-12-14 PROCEDURE — 82728 ASSAY OF FERRITIN: CPT

## 2019-12-14 PROCEDURE — 80053 COMPREHEN METABOLIC PANEL: CPT

## 2019-12-14 PROCEDURE — 83540 ASSAY OF IRON: CPT

## 2019-12-14 PROCEDURE — 85025 COMPLETE CBC W/AUTO DIFF WBC: CPT

## 2019-12-14 PROCEDURE — 84443 ASSAY THYROID STIM HORMONE: CPT

## 2019-12-14 PROCEDURE — 82607 VITAMIN B-12: CPT

## 2019-12-14 PROCEDURE — 82746 ASSAY OF FOLIC ACID SERUM: CPT

## 2019-12-14 PROCEDURE — 83550 IRON BINDING TEST: CPT

## 2019-12-14 PROCEDURE — 83970 ASSAY OF PARATHORMONE: CPT

## 2019-12-14 PROCEDURE — 84425 ASSAY OF VITAMIN B-1: CPT

## 2019-12-18 ENCOUNTER — TELEPHONE (OUTPATIENT)
Dept: BARIATRICS/WEIGHT MGMT | Age: 39
End: 2019-12-18

## 2019-12-18 LAB — VITAMIN B1 WHOLE BLOOD: 148 NMOL/L (ref 70–180)

## 2019-12-19 DIAGNOSIS — E55.9 VITAMIN D DEFICIENCY: Primary | ICD-10-CM

## 2019-12-19 DIAGNOSIS — K91.2 POSTSURGICAL MALABSORPTION: ICD-10-CM

## 2019-12-19 DIAGNOSIS — Z98.84 STATUS POST BARIATRIC SURGERY: ICD-10-CM

## 2019-12-19 DIAGNOSIS — E53.8 VITAMIN B12 DEFICIENCY: ICD-10-CM

## 2019-12-27 ENCOUNTER — TELEPHONE (OUTPATIENT)
Dept: BARIATRICS/WEIGHT MGMT | Age: 39
End: 2019-12-27

## 2020-07-15 ENCOUNTER — TELEPHONE (OUTPATIENT)
Dept: BARIATRICS/WEIGHT MGMT | Age: 40
End: 2020-07-15

## (undated) DEVICE — VISIGI 3D®  CALIBRATION SYSTEM  SIZE 36FR STD W/ BULB: Brand: BOEHRINGER® VISIGI 3D™ SLEEVE GASTRECTOMY CALIBRATION SYSTEM, SIZE 36FR W/BULB

## (undated) DEVICE — [HIGH FLOW INSUFFLATOR,  DO NOT USE IF PACKAGE IS DAMAGED,  KEEP DRY,  KEEP AWAY FROM SUNLIGHT,  PROTECT FROM HEAT AND RADIOACTIVE SOURCES.]: Brand: PNEUMOSURE

## (undated) DEVICE — 3M™ WARMING BLANKET, UPPER BODY, 10 PER CASE, 42268: Brand: BAIR HUGGER™

## (undated) DEVICE — STAPLES INT L60MM M THCK REINF INTELLIGENT RELD FOR SIGNIA

## (undated) DEVICE — LINER SUCT CANSTR 1500CC SEMI RIG W/ POR HYDROPHOBIC SHUT

## (undated) DEVICE — SET ADMIN 25ML L117IN PMP MOD CK VLV RLER CLMP 2 SMRTSITE

## (undated) DEVICE — BANDAGE ADH W1XL3IN NAT FAB WVN FLX DURABLE N ADH PD SEAL

## (undated) DEVICE — ELECTRO LUBE IS A SINGLE PATIENT USE DEVICE THAT IS INTENDED TO BE USED ON ELECTROSURGICAL ELECTRODES TO REDUCE STICKING.: Brand: KEY SURGICAL ELECTRO LUBE

## (undated) DEVICE — ARM DRAPE

## (undated) DEVICE — GLOVE SURG SZ 65 THK91MIL LTX FREE SYN POLYISOPRENE

## (undated) DEVICE — 3M™ STERI-STRIP™ COMPOUND BENZOIN TINCTURE 40 BAGS/CARTON 4 CARTONS/CASE C1544: Brand: 3M™ STERI-STRIP™

## (undated) DEVICE — PACK,UNIVERSAL,NO GOWNS: Brand: MEDLINE

## (undated) DEVICE — PACK PROCEDURE SURG SET UP SRMC

## (undated) DEVICE — BASIC SINGLE BASIN BTC-LF: Brand: MEDLINE INDUSTRIES, INC.

## (undated) DEVICE — CATHETER ETER IV 22GA L1IN POLYUR STR RADPQ INTROCAN SFTY

## (undated) DEVICE — PENROSE DRAIN 18 X .5" SILICONE: Brand: MEDLINE

## (undated) DEVICE — PUMP SUC IRR TBNG L10FT W/ HNDPC ASSEMB STRYKEFLOW 2

## (undated) DEVICE — BLADE CLIPPER GEN PURP NS

## (undated) DEVICE — CANNULA SEAL

## (undated) DEVICE — STRIP,CLOSURE,WOUND,MEDI-STRIP,1/2X4: Brand: MEDLINE

## (undated) DEVICE — TROCAR: Brand: KII FIOS FIRST ENTRY

## (undated) DEVICE — SOLUTION IV 1000ML 0.45% SOD CHL PH 5 INJ USP VIAFLX PLAS

## (undated) DEVICE — VESSEL SEALER EXTEND: Brand: ENDOWRIST

## (undated) DEVICE — GAUZE,SPONGE,8"X4",12PLY,XRAY,STRL,LF: Brand: MEDLINE

## (undated) DEVICE — COVER ARMBRD W13XL28.5IN IMPERV BLU FOR OP RM

## (undated) DEVICE — BINDER ABD UNISX 12IN 85IN 3XL UNIV

## (undated) DEVICE — TROCAR ENDOSCP L100MM DIA5MM BLDELSS STBL SL THRD OPT VW

## (undated) DEVICE — BLADELESS OBTURATOR: Brand: WECK VISTA

## (undated) DEVICE — HYPODERMIC SAFETY NEEDLE: Brand: MAGELLAN

## (undated) DEVICE — BLADE LARYNSCP SZ 4 ENH DIR INTUB GLIDESCOPE MCGRATH MAC

## (undated) DEVICE — SOLUTION IV 1000ML 0.9% SOD CHL PH 5 INJ USP VIAFLX PLAS

## (undated) DEVICE — SOLUTION ANTIFOG VIS SYS CLEARIFY LAPSCP

## (undated) DEVICE — SUTURE VCRL SZ 3-0 L27IN ABSRB UD FS-2 L19MM 1/2 CIR J423H

## (undated) DEVICE — COLUMN DRAPE

## (undated) DEVICE — GOWN,SIRUS,NON REINFRCD,LARGE,SET IN SL: Brand: MEDLINE

## (undated) DEVICE — TETRA-FLEX CF WOVEN LATEX FREE ELASTIC BANDAGE 6" X 5.5 YD: Brand: TETRA-FLEX™CF

## (undated) DEVICE — INTENDED FOR TISSUE SEPARATION, AND OTHER PROCEDURES THAT REQUIRE A SHARP SURGICAL BLADE TO PUNCTURE OR CUT.: Brand: BARD-PARKER ® CARBON RIB-BACK BLADES

## (undated) DEVICE — GLOVE ORANGE PI 7   MSG9070

## (undated) DEVICE — CHLORAPREP 26ML ORANGE

## (undated) DEVICE — TIP APPL L35CM RIG FOR SEAL EVICEL

## (undated) DEVICE — TUBING IV STOPCOCK 48 CM 3 W

## (undated) DEVICE — TROCAR ENDOSCP L100MM DIA15MM BLDELSS STBL SL ENDOPATH XCEL

## (undated) DEVICE — 35 ML SYRINGE LUER-LOCK TIP: Brand: MONOJECT